# Patient Record
Sex: MALE | Race: WHITE | NOT HISPANIC OR LATINO | Employment: FULL TIME | RURAL
[De-identification: names, ages, dates, MRNs, and addresses within clinical notes are randomized per-mention and may not be internally consistent; named-entity substitution may affect disease eponyms.]

---

## 2020-08-08 ENCOUNTER — HISTORICAL (OUTPATIENT)
Dept: ADMINISTRATIVE | Facility: HOSPITAL | Age: 20
End: 2020-08-08

## 2020-08-08 LAB
ALBUMIN SERPL BCP-MCNC: 4.2 G/DL (ref 3.5–5)
ALBUMIN/GLOB SERPL: 1.3 {RATIO}
ALP SERPL-CCNC: 92 U/L (ref 45–115)
ALT SERPL W P-5'-P-CCNC: 22 U/L (ref 16–61)
ANION GAP SERPL CALCULATED.3IONS-SCNC: 9 MMOL/L
AST SERPL W P-5'-P-CCNC: 16 U/L (ref 15–37)
BASOPHILS # BLD AUTO: 0.1 X10E3/UL (ref 0–0.2)
BASOPHILS NFR BLD AUTO: 1.2 % (ref 0–1)
BILIRUB SERPL-MCNC: 0.4 MG/DL (ref 0–1.2)
BUN SERPL-MCNC: 8 MG/DL (ref 7–18)
BUN/CREAT SERPL: 7.1
CALCIUM SERPL-MCNC: 8.8 MG/DL (ref 8.5–10.1)
CHLORIDE SERPL-SCNC: 103 MMOL/L (ref 98–107)
CO2 SERPL-SCNC: 31 MMOL/L (ref 21–32)
CREAT SERPL-MCNC: 1.13 MG/DL (ref 0.7–1.3)
D DIMER PPP FEU-MCNC: 0.32 UG/ML (ref 0–0.47)
EOSINOPHIL # BLD AUTO: 0.08 X10E3/UL (ref 0–0.5)
EOSINOPHIL NFR BLD AUTO: 0.9 % (ref 1–4)
ERYTHROCYTE [DISTWIDTH] IN BLOOD BY AUTOMATED COUNT: 11.8 % (ref 11.5–14.5)
GLOBULIN SER-MCNC: 3.2 G/DL (ref 2–4)
GLUCOSE SERPL-MCNC: 96 MG/DL (ref 74–106)
HCT VFR BLD AUTO: 41.4 % (ref 40–54)
HGB BLD-MCNC: 13.7 G/DL (ref 13.5–18)
IMM GRANULOCYTES # BLD AUTO: 0.03 X10E3/UL (ref 0–0.04)
IMM GRANULOCYTES NFR BLD: 0.3 % (ref 0–0.4)
LYMPHOCYTES # BLD AUTO: 1.71 X10E3/UL (ref 1–4.8)
LYMPHOCYTES NFR BLD AUTO: 19.9 % (ref 27–41)
MCH RBC QN AUTO: 29 PG (ref 27–31)
MCHC RBC AUTO-ENTMCNC: 33.1 G/DL (ref 32–36)
MCV RBC AUTO: 87.5 FL (ref 80–96)
MONOCYTES # BLD AUTO: 0.55 X10E3/UL (ref 0–0.8)
MONOCYTES NFR BLD AUTO: 6.4 % (ref 2–6)
MPC BLD CALC-MCNC: 8.9 FL (ref 9.4–12.4)
NEUTROPHILS # BLD AUTO: 6.14 X10E3/UL (ref 1.8–7.7)
NEUTROPHILS NFR BLD AUTO: 71.3 % (ref 53–65)
NRBC # BLD AUTO: 0 X10E3/UL (ref 0–0)
NRBC, AUTO (.00): 0 /100 (ref 0–0)
PLATELET # BLD AUTO: 323 X10E3/UL (ref 150–400)
POTASSIUM SERPL-SCNC: 3.7 MMOL/L (ref 3.5–5.1)
PROT SERPL-MCNC: 7.4 G/DL (ref 6.4–8.2)
RBC # BLD AUTO: 4.73 X10E6/UL (ref 4.6–6.2)
SODIUM SERPL-SCNC: 139 MMOL/L (ref 136–145)
TROPONIN I SERPL-MCNC: <0.017 NG/ML (ref 0–0.06)
WBC # BLD AUTO: 8.61 X10E3/UL (ref 4.5–11)

## 2020-08-09 ENCOUNTER — HISTORICAL (OUTPATIENT)
Dept: ADMINISTRATIVE | Facility: HOSPITAL | Age: 20
End: 2020-08-09

## 2021-03-27 VITALS — BODY MASS INDEX: 29.92 KG/M2 | HEIGHT: 70 IN | WEIGHT: 209 LBS

## 2021-03-27 RX ORDER — ONDANSETRON 4 MG/1
8 TABLET, FILM COATED ORAL EVERY 6 HOURS PRN
COMMUNITY

## 2021-03-27 RX ORDER — METOPROLOL SUCCINATE 25 MG/1
25 TABLET, EXTENDED RELEASE ORAL DAILY
COMMUNITY
End: 2022-01-03 | Stop reason: ALTCHOICE

## 2021-03-27 RX ORDER — LAMOTRIGINE 100 MG/1
100 TABLET ORAL DAILY
COMMUNITY
End: 2022-06-27

## 2021-03-27 RX ORDER — EPINEPHRINE 0.15 MG/.3ML
0.15 INJECTION INTRAMUSCULAR
COMMUNITY

## 2021-04-16 DIAGNOSIS — M54.50 LOW BACK PAIN: Primary | ICD-10-CM

## 2021-04-22 DIAGNOSIS — M54.9 DORSALGIA, UNSPECIFIED: ICD-10-CM

## 2021-04-26 ENCOUNTER — OFFICE VISIT (OUTPATIENT)
Dept: SPINE | Facility: CLINIC | Age: 21
End: 2021-04-26
Payer: MEDICAID

## 2021-04-26 ENCOUNTER — HOSPITAL ENCOUNTER (OUTPATIENT)
Dept: RADIOLOGY | Facility: HOSPITAL | Age: 21
Discharge: HOME | End: 2021-04-26
Attending: ORTHOPAEDIC SURGERY
Payer: MEDICAID

## 2021-04-26 VITALS — HEIGHT: 71 IN | BODY MASS INDEX: 29.4 KG/M2 | WEIGHT: 210 LBS

## 2021-04-26 DIAGNOSIS — M40.14 OTHER SECONDARY KYPHOSIS, THORACIC REGION: Primary | ICD-10-CM

## 2021-04-26 DIAGNOSIS — M54.9 DORSALGIA, UNSPECIFIED: ICD-10-CM

## 2021-04-26 DIAGNOSIS — M54.50 LOW BACK PAIN: ICD-10-CM

## 2021-04-26 DIAGNOSIS — M54.6 PAIN IN THORACIC SPINE: ICD-10-CM

## 2021-04-26 PROCEDURE — 72110 X-RAY EXAM L-2 SPINE 4/>VWS: CPT | Mod: TC

## 2021-04-26 PROCEDURE — 72110 X-RAY EXAM L-2 SPINE 4/>VWS: CPT | Mod: 26,,, | Performed by: ORTHOPAEDIC SURGERY

## 2021-04-26 PROCEDURE — 99204 PR OFFICE/OUTPT VISIT, NEW, LEVL IV, 45-59 MIN: ICD-10-PCS | Mod: S$PBB,,, | Performed by: ORTHOPAEDIC SURGERY

## 2021-04-26 PROCEDURE — 99213 OFFICE O/P EST LOW 20 MIN: CPT | Mod: PBBFAC,25 | Performed by: ORTHOPAEDIC SURGERY

## 2021-04-26 PROCEDURE — 72070 X-RAY EXAM THORAC SPINE 2VWS: CPT | Mod: TC

## 2021-04-26 PROCEDURE — 72070 XR THORACIC SPINE AP LATERAL: ICD-10-PCS | Mod: 26,,, | Performed by: ORTHOPAEDIC SURGERY

## 2021-04-26 PROCEDURE — 99999 PR PBB SHADOW E&M-EST. PATIENT-LVL III: ICD-10-PCS | Mod: PBBFAC,,, | Performed by: ORTHOPAEDIC SURGERY

## 2021-04-26 PROCEDURE — 99204 OFFICE O/P NEW MOD 45 MIN: CPT | Mod: S$PBB,,, | Performed by: ORTHOPAEDIC SURGERY

## 2021-04-26 PROCEDURE — 72070 X-RAY EXAM THORAC SPINE 2VWS: CPT | Mod: 26,,, | Performed by: ORTHOPAEDIC SURGERY

## 2021-04-26 PROCEDURE — 72110 XR LUMBAR SPINE 5 VIEW WITH FLEX AND EXT: ICD-10-PCS | Mod: 26,,, | Performed by: ORTHOPAEDIC SURGERY

## 2021-04-26 PROCEDURE — 99999 PR PBB SHADOW E&M-EST. PATIENT-LVL III: CPT | Mod: PBBFAC,,, | Performed by: ORTHOPAEDIC SURGERY

## 2021-06-25 ENCOUNTER — OFFICE VISIT (OUTPATIENT)
Dept: GASTROENTEROLOGY | Facility: CLINIC | Age: 21
End: 2021-06-25
Payer: MEDICAID

## 2021-06-25 VITALS
HEART RATE: 78 BPM | DIASTOLIC BLOOD PRESSURE: 70 MMHG | RESPIRATION RATE: 18 BRPM | OXYGEN SATURATION: 98 % | SYSTOLIC BLOOD PRESSURE: 116 MMHG

## 2021-06-25 DIAGNOSIS — R11.2 NAUSEA AND VOMITING, INTRACTABILITY OF VOMITING NOT SPECIFIED, UNSPECIFIED VOMITING TYPE: ICD-10-CM

## 2021-06-25 DIAGNOSIS — R19.7 DIARRHEA, UNSPECIFIED TYPE: ICD-10-CM

## 2021-06-25 DIAGNOSIS — R10.9 ABDOMINAL PAIN, UNSPECIFIED ABDOMINAL LOCATION: Primary | ICD-10-CM

## 2021-06-25 DIAGNOSIS — K25.9 GASTRIC ULCER, UNSPECIFIED CHRONICITY, UNSPECIFIED WHETHER GASTRIC ULCER HEMORRHAGE OR PERFORATION PRESENT: ICD-10-CM

## 2021-06-25 DIAGNOSIS — K59.04 CHRONIC IDIOPATHIC CONSTIPATION: ICD-10-CM

## 2021-06-25 PROCEDURE — 99214 PR OFFICE/OUTPT VISIT, EST, LEVL IV, 30-39 MIN: ICD-10-PCS | Mod: ,,, | Performed by: NURSE PRACTITIONER

## 2021-06-25 PROCEDURE — 99214 OFFICE O/P EST MOD 30 MIN: CPT | Mod: ,,, | Performed by: NURSE PRACTITIONER

## 2021-06-25 RX ORDER — PANTOPRAZOLE SODIUM 40 MG/1
40 TABLET, DELAYED RELEASE ORAL 2 TIMES DAILY
Qty: 180 TABLET | Refills: 3 | Status: SHIPPED | OUTPATIENT
Start: 2021-06-25 | End: 2021-09-23

## 2021-07-15 ENCOUNTER — HOSPITAL ENCOUNTER (OUTPATIENT)
Dept: GASTROENTEROLOGY | Facility: HOSPITAL | Age: 21
Discharge: HOME | End: 2021-07-15
Attending: NURSE PRACTITIONER
Payer: MEDICAID

## 2021-07-15 VITALS
SYSTOLIC BLOOD PRESSURE: 95 MMHG | BODY MASS INDEX: 28.84 KG/M2 | DIASTOLIC BLOOD PRESSURE: 47 MMHG | WEIGHT: 206 LBS | RESPIRATION RATE: 15 BRPM | OXYGEN SATURATION: 96 % | TEMPERATURE: 98 F | HEIGHT: 71 IN | HEART RATE: 53 BPM

## 2021-07-15 DIAGNOSIS — K25.9 GASTRIC ULCER, UNSPECIFIED CHRONICITY, UNSPECIFIED WHETHER GASTRIC ULCER HEMORRHAGE OR PERFORATION PRESENT: ICD-10-CM

## 2021-07-15 DIAGNOSIS — R11.2 NAUSEA AND VOMITING, INTRACTABILITY OF VOMITING NOT SPECIFIED, UNSPECIFIED VOMITING TYPE: ICD-10-CM

## 2021-07-15 DIAGNOSIS — R10.9 ABDOMINAL PAIN, UNSPECIFIED ABDOMINAL LOCATION: ICD-10-CM

## 2021-07-15 RX ORDER — SODIUM CHLORIDE 0.9 % (FLUSH) 0.9 %
10 SYRINGE (ML) INJECTION
Status: DISCONTINUED | OUTPATIENT
Start: 2021-07-15 | End: 2021-07-16 | Stop reason: HOSPADM

## 2021-07-15 RX ORDER — SODIUM CHLORIDE 9 MG/ML
INJECTION, SOLUTION INTRAVENOUS CONTINUOUS
Status: DISCONTINUED | OUTPATIENT
Start: 2021-07-15 | End: 2021-07-16 | Stop reason: HOSPADM

## 2021-07-27 DIAGNOSIS — Z12.11 ENCOUNTER FOR SCREENING COLONOSCOPY: Primary | ICD-10-CM

## 2021-09-22 ENCOUNTER — OFFICE VISIT (OUTPATIENT)
Dept: OTOLARYNGOLOGY | Facility: CLINIC | Age: 21
End: 2021-09-22
Payer: MEDICAID

## 2021-09-22 VITALS — BODY MASS INDEX: 28.84 KG/M2 | HEIGHT: 71 IN | WEIGHT: 206 LBS

## 2021-09-22 DIAGNOSIS — H72.92 TYMPANIC MEMBRANE PERFORATION, LEFT: ICD-10-CM

## 2021-09-22 DIAGNOSIS — H66.92 LEFT OTITIS MEDIA, UNSPECIFIED OTITIS MEDIA TYPE: Primary | ICD-10-CM

## 2021-09-22 PROCEDURE — 99213 OFFICE O/P EST LOW 20 MIN: CPT | Mod: PBBFAC | Performed by: OTOLARYNGOLOGY

## 2021-09-22 PROCEDURE — 99204 PR OFFICE/OUTPT VISIT, NEW, LEVL IV, 45-59 MIN: ICD-10-PCS | Mod: S$PBB,,, | Performed by: OTOLARYNGOLOGY

## 2021-09-22 PROCEDURE — 99204 OFFICE O/P NEW MOD 45 MIN: CPT | Mod: S$PBB,,, | Performed by: OTOLARYNGOLOGY

## 2021-09-22 RX ORDER — AMOXICILLIN AND CLAVULANATE POTASSIUM 500; 125 MG/1; MG/1
1 TABLET, FILM COATED ORAL 2 TIMES DAILY
Qty: 28 TABLET | Refills: 0 | Status: SHIPPED | OUTPATIENT
Start: 2021-09-22 | End: 2021-10-06

## 2021-09-26 ENCOUNTER — HOSPITAL ENCOUNTER (EMERGENCY)
Facility: HOSPITAL | Age: 21
Discharge: HOME | End: 2021-09-26
Payer: MEDICAID

## 2021-09-26 VITALS
HEIGHT: 71 IN | HEART RATE: 90 BPM | DIASTOLIC BLOOD PRESSURE: 56 MMHG | BODY MASS INDEX: 28.7 KG/M2 | RESPIRATION RATE: 20 BRPM | TEMPERATURE: 97 F | SYSTOLIC BLOOD PRESSURE: 127 MMHG | OXYGEN SATURATION: 99 % | WEIGHT: 205 LBS

## 2021-09-26 DIAGNOSIS — R21 RASH: Primary | ICD-10-CM

## 2021-09-26 PROCEDURE — 99283 EMERGENCY DEPT VISIT LOW MDM: CPT | Mod: ,,, | Performed by: NURSE PRACTITIONER

## 2021-09-26 PROCEDURE — 99284 EMERGENCY DEPT VISIT MOD MDM: CPT

## 2021-09-26 PROCEDURE — 96372 THER/PROPH/DIAG INJ SC/IM: CPT

## 2021-09-26 PROCEDURE — 99283 PR EMERGENCY DEPT VISIT,LEVEL III: ICD-10-PCS | Mod: ,,, | Performed by: NURSE PRACTITIONER

## 2021-09-26 PROCEDURE — 63600175 PHARM REV CODE 636 W HCPCS: Performed by: NURSE PRACTITIONER

## 2021-09-26 RX ORDER — METHYLPREDNISOLONE ACETATE 40 MG/ML
40 INJECTION, SUSPENSION INTRA-ARTICULAR; INTRALESIONAL; INTRAMUSCULAR; SOFT TISSUE ONCE
Status: DISCONTINUED | OUTPATIENT
Start: 2021-09-26 | End: 2021-09-26

## 2021-09-26 RX ORDER — METHYLPREDNISOLONE ACETATE 40 MG/ML
40 INJECTION, SUSPENSION INTRA-ARTICULAR; INTRALESIONAL; INTRAMUSCULAR; SOFT TISSUE ONCE
Status: COMPLETED | OUTPATIENT
Start: 2021-09-26 | End: 2021-09-26

## 2021-09-26 RX ORDER — METHYLPREDNISOLONE 4 MG/1
TABLET ORAL
Qty: 1 PACKAGE | Refills: 0 | Status: SHIPPED | OUTPATIENT
Start: 2021-09-26 | End: 2021-10-17

## 2021-09-26 RX ORDER — DEXAMETHASONE SODIUM PHOSPHATE 4 MG/ML
4 INJECTION, SOLUTION INTRA-ARTICULAR; INTRALESIONAL; INTRAMUSCULAR; INTRAVENOUS; SOFT TISSUE
Status: COMPLETED | OUTPATIENT
Start: 2021-09-26 | End: 2021-09-26

## 2021-09-26 RX ADMIN — DEXAMETHASONE SODIUM PHOSPHATE 4 MG: 4 INJECTION, SOLUTION INTRAMUSCULAR; INTRAVENOUS at 07:09

## 2021-09-26 RX ADMIN — METHYLPREDNISOLONE ACETATE 40 MG: 40 INJECTION, SUSPENSION INTRA-ARTICULAR; INTRALESIONAL; INTRAMUSCULAR; SOFT TISSUE at 07:09

## 2021-09-27 ENCOUNTER — TELEPHONE (OUTPATIENT)
Dept: EMERGENCY MEDICINE | Facility: HOSPITAL | Age: 21
End: 2021-09-27

## 2021-09-28 ENCOUNTER — TELEPHONE (OUTPATIENT)
Dept: EMERGENCY MEDICINE | Facility: HOSPITAL | Age: 21
End: 2021-09-28

## 2021-11-09 DIAGNOSIS — M25.512 LEFT SHOULDER PAIN, UNSPECIFIED CHRONICITY: Primary | ICD-10-CM

## 2021-11-10 ENCOUNTER — HOSPITAL ENCOUNTER (OUTPATIENT)
Dept: RADIOLOGY | Facility: HOSPITAL | Age: 21
Discharge: HOME | End: 2021-11-10
Attending: NURSE PRACTITIONER
Payer: MEDICAID

## 2021-11-10 ENCOUNTER — OFFICE VISIT (OUTPATIENT)
Dept: ORTHOPEDICS | Facility: CLINIC | Age: 21
End: 2021-11-10
Payer: MEDICAID

## 2021-11-10 VITALS — HEIGHT: 71 IN | BODY MASS INDEX: 28.7 KG/M2 | WEIGHT: 205 LBS

## 2021-11-10 DIAGNOSIS — M25.512 LEFT SHOULDER PAIN, UNSPECIFIED CHRONICITY: Primary | ICD-10-CM

## 2021-11-10 DIAGNOSIS — M25.512 LEFT SHOULDER PAIN, UNSPECIFIED CHRONICITY: ICD-10-CM

## 2021-11-10 PROCEDURE — 73030 X-RAY EXAM OF SHOULDER: CPT | Mod: TC,LT

## 2021-11-10 PROCEDURE — 99212 PR OFFICE/OUTPT VISIT, EST, LEVL II, 10-19 MIN: ICD-10-PCS | Mod: ,,, | Performed by: NURSE PRACTITIONER

## 2021-11-10 PROCEDURE — 73030 X-RAY EXAM OF SHOULDER: CPT | Mod: 26,LT,, | Performed by: RADIOLOGY

## 2021-11-10 PROCEDURE — 73030 XR SHOULDER COMPLETE 2 OR MORE VIEWS LEFT: ICD-10-PCS | Mod: 26,LT,, | Performed by: RADIOLOGY

## 2021-11-10 PROCEDURE — 99212 OFFICE O/P EST SF 10 MIN: CPT | Mod: ,,, | Performed by: NURSE PRACTITIONER

## 2021-11-10 RX ORDER — NAPROXEN 500 MG/1
500 TABLET ORAL 2 TIMES DAILY WITH MEALS
Qty: 60 TABLET | Refills: 1 | Status: SHIPPED | OUTPATIENT
Start: 2021-11-10 | End: 2022-01-03 | Stop reason: ALTCHOICE

## 2021-11-15 ENCOUNTER — HOSPITAL ENCOUNTER (OUTPATIENT)
Dept: CARDIOLOGY | Facility: HOSPITAL | Age: 21
Discharge: HOME | End: 2021-11-15
Attending: INTERNAL MEDICINE
Payer: MEDICAID

## 2021-11-15 DIAGNOSIS — Z95.818 PRESENCE OF CARDIAC DEVICE: Primary | ICD-10-CM

## 2021-11-15 DIAGNOSIS — Z95.818 PRESENCE OF CARDIAC DEVICE: ICD-10-CM

## 2021-11-15 PROCEDURE — 93298 CARDIAC DEVICE CHECK - REMOTE: ICD-10-PCS | Mod: ,,, | Performed by: INTERNAL MEDICINE

## 2021-11-15 PROCEDURE — 93298 REM INTERROG DEV EVAL SCRMS: CPT | Mod: ,,, | Performed by: INTERNAL MEDICINE

## 2021-11-15 PROCEDURE — G2066 INTER DEVC REMOTE 30D: HCPCS

## 2021-12-08 ENCOUNTER — HOSPITAL ENCOUNTER (EMERGENCY)
Facility: HOSPITAL | Age: 21
Discharge: ANOTHER HOSPITAL | End: 2021-12-08
Payer: MEDICAID

## 2021-12-08 VITALS
DIASTOLIC BLOOD PRESSURE: 56 MMHG | WEIGHT: 200 LBS | HEART RATE: 66 BPM | RESPIRATION RATE: 16 BRPM | BODY MASS INDEX: 25.67 KG/M2 | OXYGEN SATURATION: 99 % | SYSTOLIC BLOOD PRESSURE: 100 MMHG | TEMPERATURE: 98 F | HEIGHT: 74 IN

## 2021-12-08 DIAGNOSIS — G40.901 STATUS EPILEPTICUS: Primary | ICD-10-CM

## 2021-12-08 DIAGNOSIS — G83.84 TODD'S PARALYSIS: ICD-10-CM

## 2021-12-08 LAB
ALBUMIN SERPL BCP-MCNC: 3.9 G/DL (ref 3.5–5)
ALBUMIN/GLOB SERPL: 1.3 {RATIO}
ALP SERPL-CCNC: 74 U/L (ref 45–115)
ALT SERPL W P-5'-P-CCNC: 31 U/L (ref 16–61)
AMPHET UR QL SCN: NEGATIVE
ANION GAP SERPL CALCULATED.3IONS-SCNC: 14 MMOL/L (ref 7–16)
AST SERPL W P-5'-P-CCNC: 19 U/L (ref 15–37)
BARBITURATES UR QL SCN: NEGATIVE
BASOPHILS # BLD AUTO: 0.02 K/UL (ref 0–0.2)
BASOPHILS NFR BLD AUTO: 0.3 % (ref 0–1)
BENZODIAZ METAB UR QL SCN: NEGATIVE
BILIRUB SERPL-MCNC: 0.2 MG/DL (ref 0–1.2)
BILIRUB UR QL STRIP: NEGATIVE
BUN SERPL-MCNC: 8 MG/DL (ref 7–18)
BUN/CREAT SERPL: 9 (ref 6–20)
CALCIUM SERPL-MCNC: 8.5 MG/DL (ref 8.5–10.1)
CANNABINOIDS UR QL SCN: NEGATIVE
CHLORIDE SERPL-SCNC: 105 MMOL/L (ref 98–107)
CLARITY UR: CLEAR
CO2 SERPL-SCNC: 27 MMOL/L (ref 21–32)
COCAINE UR QL SCN: NEGATIVE
COLOR UR: YELLOW
CREAT SERPL-MCNC: 0.85 MG/DL (ref 0.7–1.3)
DIFFERENTIAL METHOD BLD: ABNORMAL
EOSINOPHIL # BLD AUTO: 0.17 K/UL (ref 0–0.5)
EOSINOPHIL NFR BLD AUTO: 2.1 % (ref 1–4)
ERYTHROCYTE [DISTWIDTH] IN BLOOD BY AUTOMATED COUNT: 12.1 % (ref 11.5–14.5)
FLUAV AG UPPER RESP QL IA.RAPID: NEGATIVE
FLUBV AG UPPER RESP QL IA.RAPID: NEGATIVE
GLOBULIN SER-MCNC: 2.9 G/DL (ref 2–4)
GLUCOSE SERPL-MCNC: 93 MG/DL (ref 74–106)
GLUCOSE UR STRIP-MCNC: NEGATIVE MG/DL
HCT VFR BLD AUTO: 38.2 % (ref 40–54)
HGB BLD-MCNC: 13.3 G/DL (ref 13.5–18)
KETONES UR STRIP-SCNC: NEGATIVE MG/DL
LEUKOCYTE ESTERASE UR QL STRIP: NEGATIVE
LYMPHOCYTES # BLD AUTO: 2.09 K/UL (ref 1–4.8)
LYMPHOCYTES NFR BLD AUTO: 26.2 % (ref 27–41)
MCH RBC QN AUTO: 29.2 PG (ref 27–31)
MCHC RBC AUTO-ENTMCNC: 34.8 G/DL (ref 32–36)
MCV RBC AUTO: 83.8 FL (ref 80–96)
MONOCYTES # BLD AUTO: 0.63 K/UL (ref 0–0.8)
MONOCYTES NFR BLD AUTO: 7.9 % (ref 2–6)
MPC BLD CALC-MCNC: 8.7 FL (ref 9.4–12.4)
NEUTROPHILS # BLD AUTO: 5.07 K/UL (ref 1.8–7.7)
NEUTROPHILS NFR BLD AUTO: 63.5 % (ref 53–65)
NITRITE UR QL STRIP: NEGATIVE
OPIATES UR QL SCN: NEGATIVE
PCP UR QL SCN: NEGATIVE
PH UR STRIP: 7 PH UNITS
PLATELET # BLD AUTO: 242 K/UL (ref 150–400)
POTASSIUM SERPL-SCNC: 3.7 MMOL/L (ref 3.5–5.1)
PROT SERPL-MCNC: 6.8 G/DL (ref 6.4–8.2)
PROT UR QL STRIP: NEGATIVE
RBC # BLD AUTO: 4.56 M/UL (ref 4.6–6.2)
RBC # UR STRIP: NEGATIVE /UL
SARS-COV+SARS-COV-2 AG RESP QL IA.RAPID: NEGATIVE
SODIUM SERPL-SCNC: 142 MMOL/L (ref 136–145)
SP GR UR STRIP: 1.01
UROBILINOGEN UR STRIP-ACNC: 1 MG/DL
WBC # BLD AUTO: 7.98 K/UL (ref 4.5–11)

## 2021-12-08 PROCEDURE — 80053 COMPREHEN METABOLIC PANEL: CPT | Performed by: FAMILY MEDICINE

## 2021-12-08 PROCEDURE — 87428 SARSCOV & INF VIR A&B AG IA: CPT | Performed by: FAMILY MEDICINE

## 2021-12-08 PROCEDURE — 99285 EMERGENCY DEPT VISIT HI MDM: CPT | Mod: ,,, | Performed by: FAMILY MEDICINE

## 2021-12-08 PROCEDURE — 85025 COMPLETE CBC W/AUTO DIFF WBC: CPT | Performed by: FAMILY MEDICINE

## 2021-12-08 PROCEDURE — 81003 URINALYSIS AUTO W/O SCOPE: CPT | Performed by: FAMILY MEDICINE

## 2021-12-08 PROCEDURE — 99285 PR EMERGENCY DEPT VISIT,LEVEL V: ICD-10-PCS | Mod: ,,, | Performed by: FAMILY MEDICINE

## 2021-12-08 PROCEDURE — 96374 THER/PROPH/DIAG INJ IV PUSH: CPT

## 2021-12-08 PROCEDURE — 99285 EMERGENCY DEPT VISIT HI MDM: CPT | Mod: 25

## 2021-12-08 PROCEDURE — 80307 DRUG TEST PRSMV CHEM ANLYZR: CPT | Performed by: FAMILY MEDICINE

## 2021-12-08 PROCEDURE — 63600175 PHARM REV CODE 636 W HCPCS: Performed by: FAMILY MEDICINE

## 2021-12-08 PROCEDURE — 36415 COLL VENOUS BLD VENIPUNCTURE: CPT | Performed by: FAMILY MEDICINE

## 2021-12-08 RX ORDER — ARIPIPRAZOLE 400 MG
400 KIT INTRAMUSCULAR
COMMUNITY

## 2021-12-08 RX ORDER — LEVETIRACETAM 500 MG/5ML
1000 INJECTION, SOLUTION, CONCENTRATE INTRAVENOUS
Status: COMPLETED | OUTPATIENT
Start: 2021-12-08 | End: 2021-12-08

## 2021-12-08 RX ORDER — LAMOTRIGINE 100 MG/1
100 TABLET ORAL 2 TIMES DAILY
COMMUNITY

## 2021-12-08 RX ADMIN — LEVETIRACETAM 1000 MG: 100 INJECTION, SOLUTION INTRAVENOUS at 01:12

## 2021-12-27 ENCOUNTER — OFFICE VISIT (OUTPATIENT)
Dept: SPINE | Facility: CLINIC | Age: 21
End: 2021-12-27
Payer: MEDICAID

## 2021-12-27 ENCOUNTER — HOSPITAL ENCOUNTER (OUTPATIENT)
Dept: RADIOLOGY | Facility: HOSPITAL | Age: 21
Discharge: HOME | End: 2021-12-27
Attending: NURSE PRACTITIONER
Payer: MEDICAID

## 2021-12-27 DIAGNOSIS — M54.6 PAIN IN THORACIC SPINE: ICD-10-CM

## 2021-12-27 DIAGNOSIS — M54.2 NECK PAIN: ICD-10-CM

## 2021-12-27 DIAGNOSIS — M54.2 CERVICAL SPINE PAIN: Primary | ICD-10-CM

## 2021-12-27 PROCEDURE — 1159F PR MEDICATION LIST DOCUMENTED IN MEDICAL RECORD: ICD-10-PCS | Mod: CPTII,,, | Performed by: NURSE PRACTITIONER

## 2021-12-27 PROCEDURE — 72050 X-RAY EXAM NECK SPINE 4/5VWS: CPT | Mod: TC

## 2021-12-27 PROCEDURE — 72070 X-RAY EXAM THORAC SPINE 2VWS: CPT | Mod: TC

## 2021-12-27 PROCEDURE — 72070 XR THORACIC SPINE AP LATERAL: ICD-10-PCS | Mod: 26,,, | Performed by: STUDENT IN AN ORGANIZED HEALTH CARE EDUCATION/TRAINING PROGRAM

## 2021-12-27 PROCEDURE — 72050 X-RAY EXAM NECK SPINE 4/5VWS: CPT | Mod: 26,,, | Performed by: STUDENT IN AN ORGANIZED HEALTH CARE EDUCATION/TRAINING PROGRAM

## 2021-12-27 PROCEDURE — 99214 OFFICE O/P EST MOD 30 MIN: CPT | Mod: S$PBB,,, | Performed by: NURSE PRACTITIONER

## 2021-12-27 PROCEDURE — 72070 X-RAY EXAM THORAC SPINE 2VWS: CPT | Mod: 26,,, | Performed by: STUDENT IN AN ORGANIZED HEALTH CARE EDUCATION/TRAINING PROGRAM

## 2021-12-27 PROCEDURE — 72050 XR CERVICAL SPINE AP LAT WITH FLEX EXTEN: ICD-10-PCS | Mod: 26,,, | Performed by: STUDENT IN AN ORGANIZED HEALTH CARE EDUCATION/TRAINING PROGRAM

## 2021-12-27 PROCEDURE — 1159F MED LIST DOCD IN RCRD: CPT | Mod: CPTII,,, | Performed by: NURSE PRACTITIONER

## 2021-12-27 PROCEDURE — 99213 OFFICE O/P EST LOW 20 MIN: CPT | Mod: PBBFAC | Performed by: NURSE PRACTITIONER

## 2021-12-27 PROCEDURE — 99214 PR OFFICE/OUTPT VISIT, EST, LEVL IV, 30-39 MIN: ICD-10-PCS | Mod: S$PBB,,, | Performed by: NURSE PRACTITIONER

## 2021-12-27 RX ORDER — GABAPENTIN 100 MG/1
100 CAPSULE ORAL 3 TIMES DAILY
Qty: 90 CAPSULE | Refills: 11 | Status: SHIPPED | OUTPATIENT
Start: 2021-12-27 | End: 2022-04-25

## 2022-01-03 ENCOUNTER — HOSPITAL ENCOUNTER (OUTPATIENT)
Dept: RADIOLOGY | Facility: HOSPITAL | Age: 22
Discharge: HOME | End: 2022-01-03
Attending: NURSE PRACTITIONER
Payer: MEDICAID

## 2022-01-03 VITALS
OXYGEN SATURATION: 96 % | BODY MASS INDEX: 28.56 KG/M2 | HEIGHT: 71 IN | RESPIRATION RATE: 18 BRPM | DIASTOLIC BLOOD PRESSURE: 45 MMHG | TEMPERATURE: 98 F | WEIGHT: 204 LBS | SYSTOLIC BLOOD PRESSURE: 84 MMHG | HEART RATE: 67 BPM

## 2022-01-03 DIAGNOSIS — M54.2 NECK PAIN: ICD-10-CM

## 2022-01-03 RX ORDER — SODIUM CHLORIDE 450 MG/100ML
INJECTION, SOLUTION INTRAVENOUS ONCE
Status: DISCONTINUED | OUTPATIENT
Start: 2022-01-03 | End: 2022-01-04 | Stop reason: HOSPADM

## 2022-01-03 RX ORDER — ARIPIPRAZOLE 400 MG
400 KIT INTRAMUSCULAR
COMMUNITY
End: 2022-06-27

## 2022-01-03 RX ORDER — DIAZEPAM 5 MG/1
5 TABLET ORAL ONCE
Status: DISCONTINUED | OUTPATIENT
Start: 2022-01-03 | End: 2022-01-04 | Stop reason: HOSPADM

## 2022-01-10 DIAGNOSIS — M54.2 CERVICAL SPINE PAIN: Primary | ICD-10-CM

## 2022-02-01 ENCOUNTER — HOSPITAL ENCOUNTER (EMERGENCY)
Facility: HOSPITAL | Age: 22
Discharge: LEFT BEFORE DISCHARGE | End: 2022-02-01
Attending: FAMILY MEDICINE
Payer: MEDICAID

## 2022-02-01 VITALS
OXYGEN SATURATION: 97 % | DIASTOLIC BLOOD PRESSURE: 60 MMHG | WEIGHT: 195 LBS | BODY MASS INDEX: 27.3 KG/M2 | HEIGHT: 71 IN | HEART RATE: 93 BPM | TEMPERATURE: 98 F | RESPIRATION RATE: 16 BRPM | SYSTOLIC BLOOD PRESSURE: 106 MMHG

## 2022-02-01 DIAGNOSIS — R55 SYNCOPE, UNSPECIFIED SYNCOPE TYPE: Primary | ICD-10-CM

## 2022-02-01 DIAGNOSIS — R56.9 SEIZURE: ICD-10-CM

## 2022-02-01 PROCEDURE — 99283 EMERGENCY DEPT VISIT LOW MDM: CPT | Mod: ,,, | Performed by: FAMILY MEDICINE

## 2022-02-01 PROCEDURE — 99283 EMERGENCY DEPT VISIT LOW MDM: CPT

## 2022-02-01 PROCEDURE — 99283 PR EMERGENCY DEPT VISIT,LEVEL III: ICD-10-PCS | Mod: ,,, | Performed by: FAMILY MEDICINE

## 2022-02-01 PROCEDURE — 93005 ELECTROCARDIOGRAM TRACING: CPT

## 2022-02-01 PROCEDURE — 93010 EKG 12-LEAD: ICD-10-PCS | Mod: ,,, | Performed by: INTERNAL MEDICINE

## 2022-02-01 PROCEDURE — 93010 ELECTROCARDIOGRAM REPORT: CPT | Mod: ,,, | Performed by: INTERNAL MEDICINE

## 2022-02-02 NOTE — ED PROVIDER NOTES
"Encounter Date: 2/1/2022       History     Chief Complaint   Patient presents with    Loss of Consciousness     Syncope vs seizure at home as reported by family to ems.  States his blood sugar was 67 at home.  RBS per EMS 104mg/dl.     Patient presents to the ER by EMS with chief complaint of a syncopal episode.  Patient was standing in his kitchen when he fell to the floor.  He is not certain how long he was out.  Blood sugar was 67 at home and his mental status improved after eating some sweets.  In the ambulance his blood glucose was 104. Patient has a history of seizures.  On presentation he is not postictal.  No other issues reported.          Review of patient's allergies indicates:   Allergen Reactions    Pseudoephedrine Itching and Other (See Comments)     hands  Numbness to hands  seizure      Asa [aspirin] Hives    Aspirin Hives     Other reaction(s): Unknown    Dimetapp cold and flu     Dimetapp cold-allergy [brompheniramine-phenylephrine] Other (See Comments)     Hands were numb    Red dye Other (See Comments)     seizure  seizure      Red dye     Sudafed cold-allergy Hives    Sudafed [pseudoephedrine hcl]      Past Medical History:   Diagnosis Date    Asthma     Bipolar disorder     "adjustment disorder"    Coronary artery disease     Digestive disorder     Palpitation     Seizures     Syncope      Past Surgical History:   Procedure Laterality Date    DENTAL SURGERY      INSERTION OF IMPLANTABLE LOOP RECORDER      VAGUS NERVE STIMULATOR INSERTION       History reviewed. No pertinent family history.  Social History     Tobacco Use    Smoking status: Current Every Day Smoker     Packs/day: 1.00    Smokeless tobacco: Never Used   Substance Use Topics    Alcohol use: Not Currently    Drug use: Never     Review of Systems   Constitutional: Negative for fever.   HENT: Negative for sore throat.    Respiratory: Negative for shortness of breath.    Cardiovascular: Negative for chest pain. "   Gastrointestinal: Negative for nausea.   Genitourinary: Negative for dysuria.   Musculoskeletal: Negative for back pain.   Skin: Negative for rash.   Neurological: Positive for syncope. Negative for weakness.   Hematological: Does not bruise/bleed easily.       Physical Exam     Initial Vitals [02/01/22 2148]   BP Pulse Resp Temp SpO2   122/68 93 16 98.3 °F (36.8 °C) 96 %      MAP       --         Physical Exam    Nursing note and vitals reviewed.  Constitutional: Vital signs are normal. He appears well-developed.   HENT:   Head: Normocephalic and atraumatic.   Eyes: Conjunctivae, EOM and lids are normal. Pupils are equal, round, and reactive to light. Right eye exhibits no discharge. Left eye exhibits no discharge. No scleral icterus.   Cardiovascular: Normal rate, regular rhythm, S1 normal, S2 normal and normal heart sounds.   Pulmonary/Chest: Breath sounds normal. No respiratory distress. He has no wheezes. He has no rhonchi. He has no rales. He exhibits no tenderness.   Abdominal: Abdomen is soft. Bowel sounds are normal. He exhibits no distension and no mass. There is no abdominal tenderness. There is no rebound and no guarding.     Neurological: He is alert and oriented to person, place, and time. GCS score is 15. GCS eye subscore is 4. GCS verbal subscore is 5. GCS motor subscore is 6.   Skin: Skin is warm, dry and intact. No rash and no abscess noted. No erythema. No pallor.   Psychiatric: He has a normal mood and affect. His speech is normal and behavior is normal. Judgment and thought content normal.         Medical Screening Exam   See Full Note    ED Course   Procedures  Labs Reviewed   CBC W/ AUTO DIFFERENTIAL    Narrative:     The following orders were created for panel order CBC Auto Differential.  Procedure                               Abnormality         Status                     ---------                               -----------         ------                     CBC with  Differential[[id number]]                                                         Please view results for these tests on the individual orders.   COMPREHENSIVE METABOLIC PANEL   TROPONIN I   CBC WITH DIFFERENTIAL     EKG Readings: (Independently Interpreted)   Rate is 89, normal sinus rhythm, normal axis, normal intervals, no obvious signs of ischemia, no significant ST segment changes.     ECG Results          EKG 12-lead (In process)  Result time 02/01/22 22:24:06    In process by Interface, Lab In [facility] (02/01/22 22:24:06)                 Narrative:    Test Reason : R56.9,    Vent. Rate : 089 BPM     Atrial Rate : 089 BPM     P-R Int : 116 ms          QRS Dur : 092 ms      QT Int : 344 ms       P-R-T Axes : 031 047 040 degrees     QTc Int : 418 ms    Normal sinus rhythm  Possible Lateral infarct ,age undetermined  Abnormal ECG  No previous ECGs available    Referred By: [email or web address]   SELF           Confirmed By:                             Imaging Results    None          Medications - No data to display  Medical Decision Making:   ED Management:  Patient refused lab work and further evaluation.  Instead he chose to sign out against medical advice.  The risks of this decision were explained to the patient in detail.  He verbalized understanding of this discussion and persisted in signing out against medical advice.                   Clinical Impression:   Final diagnoses:  [R55] Syncope, unspecified syncope type (Primary)          ED Disposition Condition    ZA Lafleur,   02/01/22 1793

## 2022-02-02 NOTE — ED TRIAGE NOTES
Brought in by [facility] with c/o syncope vs seizure per family statement.  Pt states that he don't know what happened all he remembers is he was walking out of his bedroom to the kitchen and he woke up on the floor.  Pt doesn't know how long he was out.  No loss of bowel or bladder control during this episode.

## 2022-02-03 DIAGNOSIS — M54.12 CERVICAL RADICULOPATHY: Primary | ICD-10-CM

## 2022-03-16 ENCOUNTER — HOSPITAL ENCOUNTER (EMERGENCY)
Facility: HOSPITAL | Age: 22
Discharge: LEFT BEFORE DISCHARGE | End: 2022-03-16
Payer: MEDICAID

## 2022-03-16 VITALS
RESPIRATION RATE: 12 BRPM | OXYGEN SATURATION: 97 % | HEART RATE: 94 BPM | BODY MASS INDEX: 27.72 KG/M2 | WEIGHT: 198 LBS | HEIGHT: 71 IN | SYSTOLIC BLOOD PRESSURE: 128 MMHG | TEMPERATURE: 98 F | DIASTOLIC BLOOD PRESSURE: 72 MMHG

## 2022-03-16 DIAGNOSIS — R07.9 CHEST PAIN, UNSPECIFIED TYPE: Primary | ICD-10-CM

## 2022-03-16 DIAGNOSIS — R07.9 CHEST PAIN: ICD-10-CM

## 2022-03-16 PROCEDURE — 99282 PR EMERGENCY DEPT VISIT,LEVEL II: ICD-10-PCS | Mod: ,,, | Performed by: REGISTERED NURSE

## 2022-03-16 PROCEDURE — 99282 EMERGENCY DEPT VISIT SF MDM: CPT | Mod: ,,, | Performed by: REGISTERED NURSE

## 2022-03-16 PROCEDURE — 93010 ELECTROCARDIOGRAM REPORT: CPT | Mod: ,,, | Performed by: STUDENT IN AN ORGANIZED HEALTH CARE EDUCATION/TRAINING PROGRAM

## 2022-03-16 PROCEDURE — 93010 EKG 12-LEAD: ICD-10-PCS | Mod: ,,, | Performed by: STUDENT IN AN ORGANIZED HEALTH CARE EDUCATION/TRAINING PROGRAM

## 2022-03-16 PROCEDURE — 99283 EMERGENCY DEPT VISIT LOW MDM: CPT

## 2022-03-16 PROCEDURE — 93005 ELECTROCARDIOGRAM TRACING: CPT

## 2022-03-16 RX ORDER — VERAPAMIL HYDROCHLORIDE 180 MG/1
180 CAPSULE, EXTENDED RELEASE ORAL DAILY
COMMUNITY

## 2022-03-17 NOTE — ED PROVIDER NOTES
"Encounter Date: 3/16/2022       History     Chief Complaint   Patient presents with    Chest Pain     21y-old  male received to room 3 with complaint of chest pain that started at 1800 tonight 03/16/2022. Pain described as a sharp pain with no aggravating or alleviating factors.         Review of patient's allergies indicates:   Allergen Reactions    Pseudoephedrine Itching and Other (See Comments)     hands  Numbness to hands  seizure      Asa [aspirin] Hives    Aspirin Hives     Other reaction(s): Unknown    Dimetapp cold and flu     Dimetapp cold-allergy [brompheniramine-phenylephrine] Other (See Comments)     Hands were numb    Red dye Other (See Comments)     seizure  seizure      Red dye     Sudafed cold-allergy Hives    Sudafed [pseudoephedrine hcl]      Past Medical History:   Diagnosis Date    Asthma     Bipolar disorder     "adjustment disorder"    Coronary artery disease     Digestive disorder     Palpitation     Seizures     Syncope      Past Surgical History:   Procedure Laterality Date    DENTAL SURGERY      INSERTION OF IMPLANTABLE LOOP RECORDER      VAGUS NERVE STIMULATOR INSERTION       History reviewed. No pertinent family history.  Social History     Tobacco Use    Smoking status: Current Every Day Smoker     Packs/day: 1.00    Smokeless tobacco: Never Used   Substance Use Topics    Alcohol use: Not Currently    Drug use: Never     Review of Systems   Constitutional: Negative.    HENT: Negative.    Eyes: Negative.    Respiratory: Negative for apnea, cough, choking, chest tightness, shortness of breath, wheezing and stridor.    Cardiovascular: Positive for chest pain. Negative for palpitations and leg swelling.   Gastrointestinal: Positive for nausea. Negative for vomiting.   Endocrine: Negative.    Genitourinary: Negative.    Musculoskeletal: Negative.    Skin: Negative.    Allergic/Immunologic: Negative.    Neurological: Negative.    Hematological: Negative.  " "  Psychiatric/Behavioral: Negative.        Physical Exam     Initial Vitals [03/16/22 2119]   BP Pulse Resp Temp SpO2   128/72 94 12 98.3 °F (36.8 °C) 97 %      MAP       --         Physical Exam    Constitutional: He appears well-developed and well-nourished.   HENT:   Head: Normocephalic and atraumatic.   Right Ear: External ear normal.   Left Ear: External ear normal.   Nose: Nose normal.   Mouth/Throat: Oropharynx is clear and moist.   Eyes: Conjunctivae and EOM are normal.   Neck: Neck supple.   Normal range of motion.  Cardiovascular: Normal rate, regular rhythm, normal heart sounds and intact distal pulses.   Pulmonary/Chest: Breath sounds normal.   Abdominal: Abdomen is soft. Bowel sounds are normal.   Musculoskeletal:         General: Normal range of motion.      Cervical back: Normal range of motion and neck supple.     Neurological: He is alert and oriented to person, place, and time. He has normal strength and normal reflexes.   Skin: Skin is warm and dry. Capillary refill takes less than 2 seconds.   Psychiatric: He has a normal mood and affect. His behavior is normal. Judgment and thought content normal.         Medical Screening Exam   See Full Note    ED Course   Procedures  Labs Reviewed   CBC W/ AUTO DIFFERENTIAL    Narrative:     The following orders were created for panel order CBC auto differential.  Procedure                               Abnormality         Status                     ---------                               -----------         ------                     CBC with Differential[[id number]]                                                         Please view results for these tests on the individual orders.   BASIC METABOLIC PANEL   TROPONIN I   CBC WITH DIFFERENTIAL          Imaging Results    None          Medications - No data to display  Medical Decision Making:   ED Management:  I spoke to patient at length r/t recommended labs/xray/EKG. Patient states that "once everything " "comes back I want to be transferred to [place]." I explained to patient that I would be glad to continue work up and transfer patient only if appropriate. Patient states "I want to leave now." Instructed patient that I recommended work up, patient refuses and decided to leave AMA. I discussed the dangers of leaving against medical advice including death. Patient verbalizes understanding and still wishes to leave.                    Clinical Impression:   Final diagnoses:  [R07.9] Chest pain  [R07.9] Chest pain, unspecified type (Primary)          ED Disposition Condition    ZA Ayala NP-C  03/16/22 5678    "

## 2022-04-25 ENCOUNTER — HOSPITAL ENCOUNTER (OUTPATIENT)
Dept: RADIOLOGY | Facility: HOSPITAL | Age: 22
Discharge: HOME | End: 2022-04-25
Attending: ORTHOPAEDIC SURGERY
Payer: MEDICAID

## 2022-04-25 ENCOUNTER — OFFICE VISIT (OUTPATIENT)
Dept: SPINE | Facility: CLINIC | Age: 22
End: 2022-04-25
Payer: MEDICAID

## 2022-04-25 DIAGNOSIS — M54.12 CERVICAL RADICULOPATHY: ICD-10-CM

## 2022-04-25 DIAGNOSIS — M54.16 LUMBAR RADICULOPATHY: ICD-10-CM

## 2022-04-25 DIAGNOSIS — M54.16 LUMBAR RADICULOPATHY: Primary | ICD-10-CM

## 2022-04-25 PROCEDURE — 99213 OFFICE O/P EST LOW 20 MIN: CPT | Mod: PBBFAC | Performed by: ORTHOPAEDIC SURGERY

## 2022-04-25 PROCEDURE — 72110 X-RAY EXAM L-2 SPINE 4/>VWS: CPT | Mod: 26,,, | Performed by: ORTHOPAEDIC SURGERY

## 2022-04-25 PROCEDURE — 99214 OFFICE O/P EST MOD 30 MIN: CPT | Mod: 25,S$PBB,, | Performed by: ORTHOPAEDIC SURGERY

## 2022-04-25 PROCEDURE — 72110 X-RAY EXAM L-2 SPINE 4/>VWS: CPT | Mod: TC

## 2022-04-25 PROCEDURE — 99214 PR OFFICE/OUTPT VISIT, EST, LEVL IV, 30-39 MIN: ICD-10-PCS | Mod: 25,S$PBB,, | Performed by: ORTHOPAEDIC SURGERY

## 2022-04-25 PROCEDURE — 99406 PR TOBACCO USE CESSATION INTERMEDIATE 3-10 MINUTES: ICD-10-PCS | Mod: S$PBB,,, | Performed by: ORTHOPAEDIC SURGERY

## 2022-04-25 PROCEDURE — 99406 BEHAV CHNG SMOKING 3-10 MIN: CPT | Mod: S$PBB,,, | Performed by: ORTHOPAEDIC SURGERY

## 2022-04-25 PROCEDURE — 72110 XR LUMBAR SPINE 4-5 VIEW WITH BENDING VIEWS: ICD-10-PCS | Mod: 26,,, | Performed by: ORTHOPAEDIC SURGERY

## 2022-04-25 RX ORDER — GABAPENTIN 300 MG/1
300 CAPSULE ORAL 3 TIMES DAILY
Qty: 90 CAPSULE | Refills: 11 | Status: SHIPPED | OUTPATIENT
Start: 2022-04-25 | End: 2023-05-01

## 2022-04-25 NOTE — PROGRESS NOTES
AP, lateral, flexion/extension views of the lumbar spine reviewed    On the AP there is slight lumbar curvature.  There are 5 non-rib-bearing lumbar vertebrae.  On the lateral there is maintained lumbar lordosis.  No fractures or listhesis noted.  No instability on flexion-extension views.    Impression:  Normal lumbar spine

## 2022-04-27 NOTE — PROGRESS NOTES
Smoking Cessation counseling    Time spent:  3-10 minutes (28774)    We discussed the importance, over both the short and long-term, of smoking cessation; reviewed the patient's willingness to quit; overall history and current use of tobacco smoking products; that there are a variety of methods to help with smoking diminution and cessation (stopping alone, using nicotine substitution medications/gums, Chantix, other medications, etcetera, which the patient will also discuss with his or her primary care physician); that the patient should set a date for smoking cessation; and the patient will follow up with his or her primary care physician for further support and oversight.    We also discussed that for the patient to have surgery while using nicotine, wound healing may be compromised relative to those who do not smoke; that recovery from anesthesia may sometimes be more difficult; and that quitting may decrease the heart, vascular, pulmonary effects in the short term as well as over the long term.  Smoking cessation may be useful and important for any patient who will have a fusion as part of surgery or have bone or tissue that has regrown heal (bone fusions, wound closures, etc.)  since surgical results with respect to wound healing, susceptibility to recovery from infection, bone fusion, and tissue and blood vessel health may be impaired or less optimal in smokers than nonsmokers.  We talked about the elevated risk of surgical bone fusion failure in the setting of smoking and the potential need for a higher-risk revision surgery should fusion not occur.    I reviewed this with the patient in detail and all questions were answered.  We discussed nature of the patient's condition; real alternatives and realistic options; pros and cons, risks and benefits of all the options, in detail.  I believe the patient understands the above and wishes to proceed as outlined.

## 2022-04-27 NOTE — PROGRESS NOTES
"  MDM/time:  Greater than 30 minutes spent on this encounter including 10 minutes reviewing imaging and notes, 15 minutes with the patient, 5 minutes documentation    ASSESSMENT:  21 y.o. male with lumbar strain, thoracic kyphoscoliosis     PLAN:  Refill Neurontin.  Physical therapy and home exercise program.  Follow-up in 4 months.  Continue to work on smoking cessation    HPI:  21 y.o. male here for evaluation of low back pain.  Denies any lower extremity symptoms.  Reports Flexeril been helping some.  Still complains of some left upper extremity pain into the shoulder.  Did not get the CT myelogram approve after last visit.  Still awaiting EMG bilateral upper extremities.  Has had left shoulder surgery with Dr. David Reese did well with surgery the patient reports over the past month has had increased pain in the left shoulder left arm pain in the shoulder popping out of place.  He reports he has seen the nurse practitioner in orthopedics and was told "there was nothing that could be done".  Denies any new injuries or falls.    Denies bladder bowel incontinence.  Difficulty walking more than 20 ft or standing for prolonged periods of time due to back pain.  Currently taken Tylenol and naproxen as needed for pain.  Has been to physical therapy multiple times in the past with minimal pain relief, does not want anymore PT.  Has been seen by pain management in the past was given a full back brace to wear for kyphosis patient reports he could not wear this brace due to increased pain.  No recent MRI unable to have an MRI due to a vagus nerve stimulator and a loop recorder that he has placed for history of seizures.  No prior spine surgery.  Currently smokes 1 pack of cigarettes per day.    IMAGIN2021 cervical spine xray reviewed showed:   No acute fracture or dislocation.  No dynamic instability.    2021 Thoracic spine xray showed:   On the AP there is thoracic curvature to the right.  There is a pulse " "generator over the left chest.  On the lateral view there is increased thoracic kyphosis.  No fractures or listhesis noted.    4/26/2021 Lumbar spine xray showed:   AP, lateral, flexion/extension views of the lumbar spine reviewed   On the AP there is normal coronal alignment.  There are 5 non-rib-bearing lumbar vertebra.  On the lateral there is maintained lumbar lordosis.  No fractures or listhesis noted. No instability on flexion-extension     Past Medical History:   Diagnosis Date    Asthma     Bipolar disorder     "adjustment disorder"    Coronary artery disease     Digestive disorder     Palpitation     Seizures     Syncope      Past Surgical History:   Procedure Laterality Date    DENTAL SURGERY      INSERTION OF IMPLANTABLE LOOP RECORDER      VAGUS NERVE STIMULATOR INSERTION       Social History     Tobacco Use    Smoking status: Current Every Day Smoker     Packs/day: 1.00    Smokeless tobacco: Never Used   Substance Use Topics    Alcohol use: Not Currently    Drug use: Never     Current Outpatient Medications   Medication Instructions    ABILIFY MAINTENA 400 mg, Intramuscular, Every 28 days    ABILIFY MAINTENA 400 mg, Intramuscular, Every 28 days    EPINEPHrine (EPIPEN JR) 0.15 mg, Intramuscular, As needed (PRN)    gabapentin (NEURONTIN) 300 mg, Oral, 3 times daily    lamoTRIgine (LAMICTAL) 100 mg, Oral, Daily    lamoTRIgine (LAMICTAL) 100 mg, Oral, 2 times daily    ondansetron (ZOFRAN) 8 mg, Oral, Every 6 hours PRN    pantoprazole (PROTONIX) 40 mg, Oral, 2 times daily    verapamiL (VERELAN) 180 mg, Oral, Daily     EXAM:  Constitutional  General Appearance: BMI 29.29 over weight, NAD  Psychiatric   Orientation: Oriented to time, oriented to place, oriented to person  Mood and Affect: Active and alert, normal mood, normal affect  Gait and Station   Appearance:  Normal gait, normal tandem gait, able to walk on toes, able to walk on heels  CERVICAL  Musculoskeletal System  Shoulder:  " normal appearance, no instability, no tenderness, painful decreased ROM right, normal ROM left    Cervical Spine  Inspection:  alignment normal, no muscle atrophy  Soft Tissue Palpation on the Right:  no tenderness of the paracervicals, no tenderness of the trapezius, no tenderness of the rhomboid  Soft Tissue Palpation on the Left:  no tenderness of the paracervicals, no tenderness of the trapezius, no tenderness of the rhomboid  Bony Palpation:  no tenderness of the occipital protuberance  Active Range:     Flexion decreased, Extension decreased, Rotation to the left normal, Rotation to the right normal, Lateral flexion to the left normal, Lateral flexion to the right normal   No pain elicited on motion    Motor Strength  C5 on the right:  abduction deltoid 5/5  C5 on the left:  abduction deltoid 3/5  C6 on the Right:  flexion biceps 5/5, wrist extension 5/5  C6 on the Left:  flexion biceps 4/5, wrist extension 4/5  C7 on the Right:  extension fingers 5/5, extension triceps 5/5  C7 on the Left:  extension fingers 4/5, extension triceps 4/5  C8 on the Right:  flexion fingers 5/5  C8 on the Left:  flexion fingers 4/5  T1 on the Right:  abduction fingers 5/5  T1 on the Left:  abduction fingers 4/5    Neurological System  Sensation on the Right:  normal sensation of the extremities: right, normal median nerve distribution, normal ulnar nerve distribution  Sensation on the Left:  normal sensation of the extremities: left, normal median nerve distribution, normal ulnar nerve distribution  Biceps Reflex Right:  normal, Brachioradialis Reflex Right:  normal, Triceps Reflex Right: normal  Biceps Reflex Left:  normal, Brachioradialis Reflex Left: normal, Triceps Reflex Left:  normal  Special Test on the Right:  Spurlings test negative, Hoffmans reflex absent, no ankle clonus, Durkan test negative, Tinels sign negative at the elbow  Special Test on the Left:  Spurlings test negative, Hoffmans reflex absent, no ankle clonus,  Durkan test negative, Tinels sign negative at the elbow    Skin:   Head and Neck:  normal   Right Upper Extremity:  normal   Left Upper Extremity:  normal    Cardiovascular:   Arterial Pulses Right:  radial right   Arterial Pulses Left:  radial left   Edema Right:  none   Edema Left:  none    Thoracic Spine   Inspection:  Normal alignment, no overlying skin changes  Bony Palpation:  No tenderness of the spinous process  Soft Tissue Palpation: No tenderness of the paraspinals left, no tenderness of the paraspinals right  Active Range of Motion:  extension normal, flexion normal    Motor Strength   L1 Motor Strength on the Right:  Hip flexion iliopsoas 5/5    L1 Motor Strength on the Left:  Hip flexion iliopsoas 5/5  L2-L4 Motor Strength on the Right:  Knee extension quadriceps 5/5, tibialis anterior 5/5  L2-L4 Motor Strength on the Left:  Knee extension quadriceps 5/5, tibialis anterior 5/5   L5 Motor Strength on the Right:  Extensor halluces longus 5/5,    L5 Motor Strength on the Left:  Extensor halluces longus 5/5,    S1 Motor Strength on the Right:  Plantar flexion gastrocnemius 5/5   S1 Motor Strength on the Left:  Plantar flexion gastrocnemius 5/5    Neurological System   Ankle Reflex Right:  normal   Ankle Reflex Left: normal   Knee Reflex Right:  normal   Knee Reflex Left:  normal   Sensation on the Right:  L2 normal, L3 normal, L4 normal, L5 normal, S1 normal   Sensation on the Left:  L2 normal, L3 normal, L4 normal, L5 normal, S1 normal  Special Test on the Right:  Seated straight leg raising test negative, no clonus of the ankle  Special Test on the Left:  Seated straight leg raising test negative, no clonus of the ankle    Skin   Lumbosacral Spine:  Normal skin    Cardiovascular System   Arterial Pulses Right:  Posterior tibialis normal, dorsalis pedis normal, popliteal normal   Arterial Pulses Left:  Posterior tibialis normal, dorsalis pedis normal, popliteal normal   Edema Right: None   Edema Left:   None

## 2022-04-28 ENCOUNTER — TELEPHONE (OUTPATIENT)
Dept: SPINE | Facility: CLINIC | Age: 22
End: 2022-04-28
Payer: MEDICAID

## 2022-04-28 NOTE — TELEPHONE ENCOUNTER
Patient asked that I reschedule his CT myelogram. Scheduled for 5/09 @ 420 and notified him of appt. Also notified him of EMG appt at [facility] on 5/4 @ 1230. He verbalizes understanding

## 2022-05-06 ENCOUNTER — TELEPHONE (OUTPATIENT)
Dept: SPINE | Facility: CLINIC | Age: 22
End: 2022-05-06
Payer: MEDICAID

## 2022-05-06 NOTE — TELEPHONE ENCOUNTER
RESCHEDULED HIS CT MYELOGRAM FOR 6/27 @ 730 and FOLLOW UP WITH DR RAY 6/29 @ 1015 PER HIS REQUEST. HE VERBALIZES UNDERSTANDING----- Message from Omer Burnette, Patient Care Assistant sent at 5/6/2022  9:28 AM CDT -----  Patient wants to reschedule his ct for the end of June. Patients number is [phone]

## 2022-05-18 ENCOUNTER — OUTSIDE PLACE OF SERVICE (OUTPATIENT)
Dept: CARDIOLOGY | Facility: CLINIC | Age: 22
End: 2022-05-18
Payer: MEDICAID

## 2022-05-18 PROCEDURE — 93010 PR ELECTROCARDIOGRAM REPORT: ICD-10-PCS | Mod: ,,, | Performed by: INTERNAL MEDICINE

## 2022-05-18 PROCEDURE — 93010 ELECTROCARDIOGRAM REPORT: CPT | Mod: ,,, | Performed by: INTERNAL MEDICINE

## 2022-05-21 ENCOUNTER — OUTSIDE PLACE OF SERVICE (OUTPATIENT)
Dept: CARDIOLOGY | Facility: CLINIC | Age: 22
End: 2022-05-21
Payer: MEDICAID

## 2022-05-21 PROCEDURE — 93010 ELECTROCARDIOGRAM REPORT: CPT | Mod: ,,, | Performed by: STUDENT IN AN ORGANIZED HEALTH CARE EDUCATION/TRAINING PROGRAM

## 2022-05-21 PROCEDURE — 93010 PR ELECTROCARDIOGRAM REPORT: ICD-10-PCS | Mod: ,,, | Performed by: STUDENT IN AN ORGANIZED HEALTH CARE EDUCATION/TRAINING PROGRAM

## 2022-05-31 ENCOUNTER — OUTSIDE PLACE OF SERVICE (OUTPATIENT)
Dept: CARDIOLOGY | Facility: CLINIC | Age: 22
End: 2022-05-31
Payer: MEDICAID

## 2022-05-31 PROCEDURE — 93010 ELECTROCARDIOGRAM REPORT: CPT | Mod: ,,, | Performed by: INTERNAL MEDICINE

## 2022-05-31 PROCEDURE — 93010 PR ELECTROCARDIOGRAM REPORT: ICD-10-PCS | Mod: ,,, | Performed by: INTERNAL MEDICINE

## 2022-06-02 ENCOUNTER — TELEPHONE (OUTPATIENT)
Dept: SPINE | Facility: CLINIC | Age: 22
End: 2022-06-02
Payer: MEDICAID

## 2022-06-02 NOTE — TELEPHONE ENCOUNTER
Notified patient Dr. Lennon reviewed EMG result- Normal study and patient may follow up PRN. Patient verbalizes understanding.

## 2022-06-27 ENCOUNTER — HOSPITAL ENCOUNTER (OUTPATIENT)
Dept: RADIOLOGY | Facility: HOSPITAL | Age: 22
Discharge: HOME | End: 2022-06-27
Attending: NURSE PRACTITIONER
Payer: MEDICAID

## 2022-06-27 VITALS
SYSTOLIC BLOOD PRESSURE: 104 MMHG | OXYGEN SATURATION: 98 % | HEART RATE: 62 BPM | DIASTOLIC BLOOD PRESSURE: 51 MMHG | TEMPERATURE: 98 F | RESPIRATION RATE: 16 BRPM

## 2022-06-27 LAB
APTT PPP: 26.4 SECONDS (ref 25.2–37.3)
BASOPHILS # BLD AUTO: 0 K/UL (ref 0–0.2)
BASOPHILS NFR BLD AUTO: 0 % (ref 0–1)
DIFFERENTIAL METHOD BLD: ABNORMAL
EOSINOPHIL # BLD AUTO: 0.11 K/UL (ref 0–0.5)
EOSINOPHIL NFR BLD AUTO: 1.7 % (ref 1–4)
ERYTHROCYTE [DISTWIDTH] IN BLOOD BY AUTOMATED COUNT: 11.8 % (ref 11.5–14.5)
HCT VFR BLD AUTO: 40.7 % (ref 40–54)
HGB BLD-MCNC: 13.6 G/DL (ref 13.5–18)
IMM GRANULOCYTES # BLD AUTO: 0.02 K/UL (ref 0–0.04)
IMM GRANULOCYTES NFR BLD: 0.3 % (ref 0–0.4)
INR BLD: 1.12 (ref 0.9–1.1)
LYMPHOCYTES # BLD AUTO: 1.72 K/UL (ref 1–4.8)
LYMPHOCYTES NFR BLD AUTO: 26.1 % (ref 27–41)
MCH RBC QN AUTO: 28.8 PG (ref 27–31)
MCHC RBC AUTO-ENTMCNC: 33.4 G/DL (ref 32–36)
MCV RBC AUTO: 86.2 FL (ref 80–96)
MONOCYTES # BLD AUTO: 0.52 K/UL (ref 0–0.8)
MONOCYTES NFR BLD AUTO: 7.9 % (ref 2–6)
MPC BLD CALC-MCNC: 9 FL (ref 9.4–12.4)
NEUTROPHILS # BLD AUTO: 4.22 K/UL (ref 1.8–7.7)
NEUTROPHILS NFR BLD AUTO: 64 % (ref 53–65)
NRBC # BLD AUTO: 0 X10E3/UL
NRBC, AUTO (.00): 0 %
PLATELET # BLD AUTO: 286 K/UL (ref 150–400)
PROTHROMBIN TIME: 14 SECONDS (ref 11.7–14.7)
RBC # BLD AUTO: 4.72 M/UL (ref 4.6–6.2)
WBC # BLD AUTO: 6.59 K/UL (ref 4.5–11)

## 2022-06-27 PROCEDURE — 36415 COLL VENOUS BLD VENIPUNCTURE: CPT | Performed by: NURSE PRACTITIONER

## 2022-06-27 PROCEDURE — 85025 COMPLETE CBC W/AUTO DIFF WBC: CPT | Performed by: STUDENT IN AN ORGANIZED HEALTH CARE EDUCATION/TRAINING PROGRAM

## 2022-06-27 PROCEDURE — 36415 COLL VENOUS BLD VENIPUNCTURE: CPT | Performed by: STUDENT IN AN ORGANIZED HEALTH CARE EDUCATION/TRAINING PROGRAM

## 2022-06-27 PROCEDURE — 85730 THROMBOPLASTIN TIME PARTIAL: CPT | Performed by: STUDENT IN AN ORGANIZED HEALTH CARE EDUCATION/TRAINING PROGRAM

## 2022-06-27 RX ORDER — DIAZEPAM 5 MG/1
10 TABLET ORAL ONCE
Status: DISCONTINUED | OUTPATIENT
Start: 2022-06-27 | End: 2022-06-28 | Stop reason: HOSPADM

## 2022-06-27 RX ORDER — DIAZEPAM 5 MG/1
10 TABLET ORAL ONCE
Status: DISCONTINUED | OUTPATIENT
Start: 2022-06-27 | End: 2022-06-27

## 2022-06-27 RX ORDER — DIAZEPAM 5 MG/1
5 TABLET ORAL EVERY 6 HOURS PRN
Status: DISCONTINUED | OUTPATIENT
Start: 2022-06-27 | End: 2022-06-28 | Stop reason: HOSPADM

## 2022-06-27 RX ORDER — SODIUM CHLORIDE 450 MG/100ML
INJECTION, SOLUTION INTRAVENOUS ONCE
Status: DISCONTINUED | OUTPATIENT
Start: 2022-06-27 | End: 2022-06-28 | Stop reason: HOSPADM

## 2022-07-01 ENCOUNTER — HOSPITAL ENCOUNTER (EMERGENCY)
Facility: HOSPITAL | Age: 22
Discharge: HOME | End: 2022-07-01
Payer: MEDICAID

## 2022-07-01 VITALS
HEIGHT: 71 IN | SYSTOLIC BLOOD PRESSURE: 114 MMHG | HEART RATE: 91 BPM | OXYGEN SATURATION: 97 % | TEMPERATURE: 99 F | WEIGHT: 188 LBS | BODY MASS INDEX: 26.32 KG/M2 | DIASTOLIC BLOOD PRESSURE: 70 MMHG | RESPIRATION RATE: 18 BRPM

## 2022-07-01 DIAGNOSIS — M79.604 PAIN OF RIGHT LOWER EXTREMITY DUE TO INJURY: ICD-10-CM
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
## 2022-07-01 DIAGNOSIS — S09.90XA MINOR HEAD INJURY, INITIAL ENCOUNTER: ICD-10-CM

## 2022-07-01 PROCEDURE — 99282 EMERGENCY DEPT VISIT SF MDM: CPT | Mod: ,,, | Performed by: REGISTERED NURSE

## 2022-07-01 PROCEDURE — 99284 EMERGENCY DEPT VISIT MOD MDM: CPT | Mod: 25

## 2022-07-01 PROCEDURE — 99282 PR EMERGENCY DEPT VISIT,LEVEL II: ICD-10-PCS | Mod: ,,, | Performed by: REGISTERED NURSE

## 2022-07-01 RX ORDER — TOPIRAMATE 25 MG/1
25 TABLET ORAL 2 TIMES DAILY
COMMUNITY

## 2022-07-01 NOTE — Clinical Note
"Javi Davenportmakenzie Farnsworth was seen and treated in our emergency department on 7/1/2022.  He may return to work on 07/02/2022.       If you have any questions or concerns, please don't hesitate to call.      ANAID Sutton/Petty ONEILL    "
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
## 2022-07-04 ENCOUNTER — TELEPHONE (OUTPATIENT)
Dept: EMERGENCY MEDICINE | Facility: HOSPITAL | Age: 22
End: 2022-07-04
Payer: MEDICAID

## 2022-07-07 ENCOUNTER — OUTSIDE PLACE OF SERVICE (OUTPATIENT)
Dept: CARDIOLOGY | Facility: HOSPITAL | Age: 22
End: 2022-07-07
Payer: COMMERCIAL

## 2022-07-07 PROCEDURE — 93010 PR ELECTROCARDIOGRAM REPORT: ICD-10-PCS | Mod: ,,, | Performed by: INTERNAL MEDICINE

## 2022-07-07 PROCEDURE — 93010 ELECTROCARDIOGRAM REPORT: CPT | Mod: ,,, | Performed by: INTERNAL MEDICINE

## 2022-10-24 DIAGNOSIS — M79.644 FINGER PAIN, RIGHT: Primary | ICD-10-CM

## 2022-10-25 ENCOUNTER — HOSPITAL ENCOUNTER (OUTPATIENT)
Dept: RADIOLOGY | Facility: HOSPITAL | Age: 22
Discharge: HOME | End: 2022-10-25
Attending: NURSE PRACTITIONER
Payer: MEDICAID

## 2022-10-25 DIAGNOSIS — M79.641 RIGHT HAND PAIN: ICD-10-CM

## 2022-10-25 DIAGNOSIS — M79.644 FINGER PAIN, RIGHT: ICD-10-CM

## 2022-10-25 PROBLEM — S62.339A BOXER'S METACARPAL FRACTURE, NECK, CLOSED: Status: ACTIVE | Noted: 2022-10-25

## 2022-10-25 PROCEDURE — 73130 X-RAY EXAM OF HAND: CPT | Mod: 26,RT,, | Performed by: RADIOLOGY

## 2022-10-25 PROCEDURE — 73140 X-RAY EXAM OF FINGER(S): CPT | Mod: TC,59,RT

## 2022-10-25 PROCEDURE — 73140 X-RAY EXAM OF FINGER(S): CPT | Mod: 26,RT,, | Performed by: STUDENT IN AN ORGANIZED HEALTH CARE EDUCATION/TRAINING PROGRAM

## 2022-10-25 PROCEDURE — 73130 X-RAY EXAM OF HAND: CPT | Mod: TC,RT

## 2022-10-25 PROCEDURE — 73140 XR FINGER 2 OR MORE VIEWS RIGHT: ICD-10-PCS | Mod: 26,RT,, | Performed by: STUDENT IN AN ORGANIZED HEALTH CARE EDUCATION/TRAINING PROGRAM

## 2022-10-25 PROCEDURE — 73130 XR HAND COMPLETE 3 VIEW RIGHT: ICD-10-PCS | Mod: 26,RT,, | Performed by: RADIOLOGY

## 2023-05-01 ENCOUNTER — OFFICE VISIT (OUTPATIENT)
Dept: SPINE | Facility: CLINIC | Age: 23
End: 2023-05-01
Payer: MEDICAID

## 2023-05-01 VITALS — BODY MASS INDEX: 26.32 KG/M2 | HEIGHT: 71 IN | WEIGHT: 188 LBS

## 2023-05-01 DIAGNOSIS — M54.16 LUMBAR RADICULOPATHY, CHRONIC: ICD-10-CM

## 2023-05-01 DIAGNOSIS — M54.12 CERVICAL RADICULOPATHY: Primary | ICD-10-CM

## 2023-05-01 DIAGNOSIS — M54.16 LUMBAR RADICULOPATHY: Primary | ICD-10-CM

## 2023-05-01 DIAGNOSIS — M54.12 CERVICAL RADICULOPATHY: ICD-10-CM

## 2023-05-01 PROCEDURE — 99213 OFFICE O/P EST LOW 20 MIN: CPT | Mod: PBBFAC | Performed by: ORTHOPAEDIC SURGERY

## 2023-05-01 PROCEDURE — 99214 PR OFFICE/OUTPT VISIT, EST, LEVL IV, 30-39 MIN: ICD-10-PCS | Mod: 25,S$PBB,, | Performed by: ORTHOPAEDIC SURGERY

## 2023-05-01 PROCEDURE — 3008F PR BODY MASS INDEX (BMI) DOCUMENTED: ICD-10-PCS | Mod: CPTII,,, | Performed by: ORTHOPAEDIC SURGERY

## 2023-05-01 PROCEDURE — 99214 OFFICE O/P EST MOD 30 MIN: CPT | Mod: 25,S$PBB,, | Performed by: ORTHOPAEDIC SURGERY

## 2023-05-01 PROCEDURE — 1159F MED LIST DOCD IN RCRD: CPT | Mod: CPTII,,, | Performed by: ORTHOPAEDIC SURGERY

## 2023-05-01 PROCEDURE — 99406 BEHAV CHNG SMOKING 3-10 MIN: CPT | Mod: S$PBB,,, | Performed by: ORTHOPAEDIC SURGERY

## 2023-05-01 PROCEDURE — 1159F PR MEDICATION LIST DOCUMENTED IN MEDICAL RECORD: ICD-10-PCS | Mod: CPTII,,, | Performed by: ORTHOPAEDIC SURGERY

## 2023-05-01 PROCEDURE — 3008F BODY MASS INDEX DOCD: CPT | Mod: CPTII,,, | Performed by: ORTHOPAEDIC SURGERY

## 2023-05-01 PROCEDURE — 99406 PR TOBACCO USE CESSATION INTERMEDIATE 3-10 MINUTES: ICD-10-PCS | Mod: S$PBB,,, | Performed by: ORTHOPAEDIC SURGERY

## 2023-05-01 RX ORDER — GABAPENTIN 600 MG/1
600 TABLET ORAL 3 TIMES DAILY
Qty: 90 TABLET | Refills: 11 | Status: SHIPPED | OUTPATIENT
Start: 2023-05-01 | End: 2024-04-30

## 2023-05-01 NOTE — PROGRESS NOTES
"  MDM/time:  Greater than 30 minutes spent on this encounter including 10 minutes reviewing imaging and notes, 15 minutes with the patient, 5 minutes documentation    ASSESSMENT:  22 y.o. male with lumbar strain, thoracic kyphoscoliosis     PLAN:  Increase Neurontin to 600 mg.  MRI cervical and lumbar.  Continue to work on smoking cessation    HPI:  22 y.o. male here for repeat evaluation of low back pain.  Complaining of worsening neck, upper and lower back pain.  Reports neck pain radiates into bilateral upper extremities.  Reports he recently completed 2 months of physical therapy for cervical and lumbar spine at [facility]. Has had left shoulder surgery with Dr. David Reese did well with surgery the patient reports over the past month has had increased pain in the left shoulder left arm pain in the shoulder popping out of place.  He reports he has seen the nurse practitioner in orthopedics and was told "there was nothing that could be done".  Denies any new injuries or falls.    Denies bladder bowel incontinence.  Difficulty walking more than 20 ft or standing for prolonged periods of time due to back pain.  Currently taken Tylenol and naproxen as needed for pain.  Has been to physical therapy multiple times in the past with minimal pain relief, does not want anymore PT.  Has been seen by pain management in the past was given a full back brace to wear for kyphosis patient reports he could not wear this brace due to increased pain.  No recent MRI, previously unable to have an MRI due to a vagus nerve stimulator and a loop recorder that he has placed for history of seizures.  He now reports that he is able to disable the stimulator and get an MRI.  No prior spine surgery.  Currently smokes 1 pack of cigarettes per day.    IMAGIN2021 cervical spine xray reviewed showed:   No acute fracture or dislocation.  No dynamic instability.    2021 Thoracic spine xray showed:   On the AP there is thoracic " "curvature to the right.  There is a pulse generator over the left chest.  On the lateral view there is increased thoracic kyphosis.  No fractures or listhesis noted.    4/26/2021 Lumbar spine xray showed:   AP, lateral, flexion/extension views of the lumbar spine reviewed   On the AP there is normal coronal alignment.  There are 5 non-rib-bearing lumbar vertebra.  On the lateral there is maintained lumbar lordosis.  No fractures or listhesis noted. No instability on flexion-extension     Past Medical History:   Diagnosis Date    Asthma     Bipolar disorder     "adjustment disorder"    Coronary artery disease     Digestive disorder     Palpitation     Seizures     Syncope      Past Surgical History:   Procedure Laterality Date    DENTAL SURGERY      INSERTION OF IMPLANTABLE LOOP RECORDER      VAGUS NERVE STIMULATOR INSERTION       Social History     Tobacco Use    Smoking status: Every Day     Packs/day: 1.00     Years: 7.00     Pack years: 7.00     Types: Cigarettes    Smokeless tobacco: Never   Substance Use Topics    Alcohol use: Not Currently    Drug use: Never     Current Outpatient Medications   Medication Instructions    ABILIFY MAINTENA 400 mg, Intramuscular, Every 28 days    EPINEPHrine (EPIPEN JR) 0.15 mg, Intramuscular, As needed (PRN)    gabapentin (NEURONTIN) 300 mg, Oral, 3 times daily    lamoTRIgine (LAMICTAL) 100 mg, Oral, 2 times daily    ondansetron (ZOFRAN) 8 mg, Oral, Every 6 hours PRN    pantoprazole (PROTONIX) 40 mg, Oral, 2 times daily    topiramate (TOPAMAX) 25 mg, Oral, 2 times daily    verapamiL (VERELAN) 180 mg, Oral, Daily     EXAM:  Constitutional  General Appearance: BMI 29.29 over weight, NAD  Psychiatric   Orientation: Oriented to time, oriented to place, oriented to person  Mood and Affect: Active and alert, normal mood, normal affect  Gait and Station   Appearance:  Normal gait, normal tandem gait, able to walk on toes, able to walk on heels  CERVICAL  Musculoskeletal " System  Shoulder:  normal appearance, no instability, no tenderness, painful decreased ROM right, normal ROM left    Cervical Spine  Inspection:  alignment normal, no muscle atrophy  Soft Tissue Palpation on the Right:  no tenderness of the paracervicals, no tenderness of the trapezius, no tenderness of the rhomboid  Soft Tissue Palpation on the Left:  no tenderness of the paracervicals, no tenderness of the trapezius, no tenderness of the rhomboid  Bony Palpation:  no tenderness of the occipital protuberance  Active Range:     Flexion decreased, Extension decreased, Rotation to the left normal, Rotation to the right normal, Lateral flexion to the left normal, Lateral flexion to the right normal   No pain elicited on motion    Motor Strength  C5 on the right:  abduction deltoid 5/5  C5 on the left:  abduction deltoid 3/5  C6 on the Right:  flexion biceps 5/5, wrist extension 5/5  C6 on the Left:  flexion biceps 4/5, wrist extension 4/5  C7 on the Right:  extension fingers 5/5, extension triceps 5/5  C7 on the Left:  extension fingers 4/5, extension triceps 4/5  C8 on the Right:  flexion fingers 5/5  C8 on the Left:  flexion fingers 4/5  T1 on the Right:  abduction fingers 5/5  T1 on the Left:  abduction fingers 4/5    Neurological System  Sensation on the Right:  normal sensation of the extremities: right, normal median nerve distribution, normal ulnar nerve distribution  Sensation on the Left:  normal sensation of the extremities: left, normal median nerve distribution, normal ulnar nerve distribution  Biceps Reflex Right:  normal, Brachioradialis Reflex Right:  normal, Triceps Reflex Right: normal  Biceps Reflex Left:  normal, Brachioradialis Reflex Left: normal, Triceps Reflex Left:  normal  Special Test on the Right:  Spurlings test negative, Hoffmans reflex absent, no ankle clonus, Durkan test negative, Tinels sign negative at the elbow  Special Test on the Left:  Spurlings test negative, Hoffmans reflex  absent, no ankle clonus, Durkan test negative, Tinels sign negative at the elbow    Skin:   Head and Neck:  normal   Right Upper Extremity:  normal   Left Upper Extremity:  normal    Cardiovascular:   Arterial Pulses Right:  radial right   Arterial Pulses Left:  radial left   Edema Right:  none   Edema Left:  none    Thoracic Spine   Inspection:  Normal alignment, no overlying skin changes  Bony Palpation:  No tenderness of the spinous process  Soft Tissue Palpation: No tenderness of the paraspinals left, no tenderness of the paraspinals right  Active Range of Motion:  extension normal, flexion normal    Motor Strength   L1 Motor Strength on the Right:  Hip flexion iliopsoas 5/5    L1 Motor Strength on the Left:  Hip flexion iliopsoas 5/5  L2-L4 Motor Strength on the Right:  Knee extension quadriceps 5/5, tibialis anterior 5/5  L2-L4 Motor Strength on the Left:  Knee extension quadriceps 5/5, tibialis anterior 5/5   L5 Motor Strength on the Right:  Extensor halluces longus 5/5,    L5 Motor Strength on the Left:  Extensor halluces longus 5/5,    S1 Motor Strength on the Right:  Plantar flexion gastrocnemius 5/5   S1 Motor Strength on the Left:  Plantar flexion gastrocnemius 5/5    Neurological System   Ankle Reflex Right:  normal   Ankle Reflex Left: normal   Knee Reflex Right:  normal   Knee Reflex Left:  normal   Sensation on the Right:  L2 normal, L3 normal, L4 normal, L5 normal, S1 normal   Sensation on the Left:  L2 normal, L3 normal, L4 normal, L5 normal, S1 normal  Special Test on the Right:  Seated straight leg raising test negative, no clonus of the ankle  Special Test on the Left:  Seated straight leg raising test negative, no clonus of the ankle    Skin   Lumbosacral Spine:  Normal skin    Cardiovascular System   Arterial Pulses Right:  Posterior tibialis normal, dorsalis pedis normal, popliteal normal   Arterial Pulses Left:  Posterior tibialis normal, dorsalis pedis normal, popliteal normal   Edema  Right: None   Edema Left:  None

## 2023-05-01 NOTE — PROGRESS NOTES
Smoking Cessation counseling    Time spent:  3-10 minutes ([id number])    We discussed the importance, over both the short and long-term, of smoking cessation; reviewed the patient's willingness to quit; overall history and current use of tobacco smoking products; that there are a variety of methods to help with smoking diminution and cessation (stopping alone, using nicotine substitution medications/gums, Chantix, other medications, etcetera, which the patient will also discuss with his or her primary care physician); that the patient should set a date for smoking cessation; and the patient will follow up with his or her primary care physician for further support and oversight.    We also discussed that for the patient to have surgery while using nicotine, wound healing may be compromised relative to those who do not smoke; that recovery from anesthesia may sometimes be more difficult; and that quitting may decrease the heart, vascular, pulmonary effects in the short term as well as over the long term.  Smoking cessation may be useful and important for any patient who will have a fusion as part of surgery or have bone or tissue that has regrown heal (bone fusions, wound closures, etc.)  since surgical results with respect to wound healing, susceptibility to recovery from infection, bone fusion, and tissue and blood vessel health may be impaired or less optimal in smokers than nonsmokers.  We talked about the elevated risk of surgical bone fusion failure in the setting of smoking and the potential need for a higher-risk revision surgery should fusion not occur.    I reviewed this with the patient in detail and all questions were answered.  We discussed nature of the patient's condition; real alternatives and realistic options; pros and cons, risks and benefits of all the options, in detail.  I believe the patient understands the above and wishes to proceed as outlined.

## 2023-05-19 ENCOUNTER — TELEPHONE (OUTPATIENT)
Dept: SPINE | Facility: CLINIC | Age: 23
End: 2023-05-19
Payer: MEDICAID

## 2023-05-19 ENCOUNTER — DOCUMENTATION ONLY (OUTPATIENT)
Dept: SPINE | Facility: CLINIC | Age: 23
End: 2023-05-19
Payer: MEDICAID

## 2023-05-19 NOTE — PROGRESS NOTES
FROM [employer]      [facility] ([facility]) on behalf of [employer] ([employer]) received a  request for Javi CHRIS McduffieFarnsworth. The request on May 17, 2023 of a(n) Lumbar Spine MRI  (Magnetic Resonance Imaging - pictures of inside your lower spine) was denied.  The reason for this decision is The notes given do not meet [facility]'s Lumbar Spine MRI  guidelines. Thus, the procedure is not shown to be medically needed. A physician reviewer  made this decision based on the notes given (you have back pain). Prior to an approval, the  following notes should be given: doctor's notes that say you did six weeks of back exercises  (physical therapy, chiropractic treatments, or medically directed home exercise program) in  the last six months. We also need to know the number of visits you went to. If you did this,  the notes do not show the dates that you did the exercises. We also need to know that you  did not get better. The Lumbar report say the same thing!

## 2023-05-19 NOTE — TELEPHONE ENCOUNTER
I have shown Dr. Lennon the information about the MRI denial. He states that patient needs to do PT before MRI can be done.   have attempted to contact patient on all phone numbers, multiple times. No answers. I have left voicemail with the following info:    MRIs have been denied by insurance company. I will have to cancel MRIs and f/u appts. Insurance requires that he complete 6 weeks of PT before MRI can be done. Patient needs to let us know where he would like to do PT and then would need to follow up after he completes PT.

## 2023-05-22 DIAGNOSIS — M54.12 CERVICAL RADICULOPATHY: ICD-10-CM

## 2023-05-22 DIAGNOSIS — M54.16 LUMBAR RADICULOPATHY: Primary | ICD-10-CM

## 2023-06-30 DIAGNOSIS — M54.16 LUMBAR RADICULOPATHY: Primary | ICD-10-CM

## 2023-07-21 DIAGNOSIS — M25.512 LEFT SHOULDER PAIN, UNSPECIFIED CHRONICITY: Primary | ICD-10-CM

## 2023-07-25 ENCOUNTER — HOSPITAL ENCOUNTER (OUTPATIENT)
Dept: RADIOLOGY | Facility: HOSPITAL | Age: 23
Discharge: HOME | End: 2023-07-25
Attending: NURSE PRACTITIONER
Payer: MEDICAID

## 2023-07-25 DIAGNOSIS — M25.512 LEFT SHOULDER PAIN, UNSPECIFIED CHRONICITY: ICD-10-CM

## 2023-08-09 DIAGNOSIS — M54.16 LUMBAR RADICULOPATHY: Primary | ICD-10-CM

## 2023-09-18 DIAGNOSIS — M25.512 LEFT SHOULDER PAIN, UNSPECIFIED CHRONICITY: Primary | ICD-10-CM

## 2023-10-19 ENCOUNTER — OFFICE VISIT (OUTPATIENT)
Dept: SPINE | Facility: CLINIC | Age: 23
End: 2023-10-19
Payer: MEDICAID

## 2023-10-19 DIAGNOSIS — M54.12 CERVICAL RADICULOPATHY: Primary | ICD-10-CM

## 2023-10-19 DIAGNOSIS — M54.2 CERVICAL SPINE PAIN: ICD-10-CM

## 2023-10-19 DIAGNOSIS — M54.16 LUMBAR RADICULOPATHY, CHRONIC: ICD-10-CM

## 2023-10-19 PROCEDURE — 99406 PR TOBACCO USE CESSATION INTERMEDIATE 3-10 MINUTES: ICD-10-PCS | Mod: S$PBB,,, | Performed by: ORTHOPAEDIC SURGERY

## 2023-10-19 PROCEDURE — 1159F MED LIST DOCD IN RCRD: CPT | Mod: CPTII,,, | Performed by: ORTHOPAEDIC SURGERY

## 2023-10-19 PROCEDURE — 99214 PR OFFICE/OUTPT VISIT, EST, LEVL IV, 30-39 MIN: ICD-10-PCS | Mod: 25,S$PBB,, | Performed by: ORTHOPAEDIC SURGERY

## 2023-10-19 PROCEDURE — 99213 OFFICE O/P EST LOW 20 MIN: CPT | Mod: PBBFAC | Performed by: ORTHOPAEDIC SURGERY

## 2023-10-19 PROCEDURE — 99406 BEHAV CHNG SMOKING 3-10 MIN: CPT | Mod: S$PBB,,, | Performed by: ORTHOPAEDIC SURGERY

## 2023-10-19 PROCEDURE — 1159F PR MEDICATION LIST DOCUMENTED IN MEDICAL RECORD: ICD-10-PCS | Mod: CPTII,,, | Performed by: ORTHOPAEDIC SURGERY

## 2023-10-19 PROCEDURE — 99214 OFFICE O/P EST MOD 30 MIN: CPT | Mod: 25,S$PBB,, | Performed by: ORTHOPAEDIC SURGERY

## 2023-10-19 NOTE — PROGRESS NOTES
MDM/time:  Greater than 30 minutes spent on this encounter including 10 minutes reviewing imaging and notes, 15 minutes with the patient, 5 minutes documentation    ASSESSMENT:  22 y.o. male with lumbar strain, thoracic kyphoscoliosis     PLAN:  MRI cervical and lumbar.  Continue to work on smoking cessation.  Referral to pain management    MRI/CT myelogram authorization checklist:  Indication for MRI/CT Myelogram:  Patient has radiculopathy/claudication that is refractory to appropriate conservative treatment    Prior conservative treatment:  Patient attempted and was unable to complete physical therapy due to worsening pain and Patient has tried NSAIDs and neuromodulators (neurontin, lyrica, elavil, or cymbalta)    HPI:  22 y.o. male here for repeat evaluation of neck and low back pain.  Complaining of worsening neck, upper and lower back pain.  Reports neck pain radiates into bilateral upper extremities.  He says at in the last month he has attempted physical therapy for cervical and lumbar spine at [facility] but this was making his pain worse and he was unable to tolerate it. Has had left shoulder surgery with Dr. David Reese.  Denies any new injuries or falls.    Denies bladder bowel incontinence.  Difficulty walking more than 20 ft or standing for prolonged periods of time due to back pain.  Currently taken Tylenol and naproxen as needed for pain.  Has been seen by pain management in the past was given a full back brace to wear for kyphosis patient reports he could not wear this brace due to increased pain.  No recent MRI, previously unable to have an MRI due to a vagus nerve stimulator and a loop recorder that he has placed for history of seizures.  He now reports that he is able to disable the stimulator and get an MRI.  No prior spine surgery.  Currently smokes 1 pack of cigarettes per day.    IMAGIN2021 cervical spine xray reviewed showed:   No acute fracture or dislocation.  No dynamic  "instability.    4/26/2021 Thoracic spine xray showed:   On the AP there is thoracic curvature to the right.  There is a pulse generator over the left chest.  On the lateral view there is increased thoracic kyphosis.  No fractures or listhesis noted.    4/26/2021 Lumbar spine xray showed:   AP, lateral, flexion/extension views of the lumbar spine reviewed   On the AP there is normal coronal alignment.  There are 5 non-rib-bearing lumbar vertebra.  On the lateral there is maintained lumbar lordosis.  No fractures or listhesis noted. No instability on flexion-extension     Past Medical History:   Diagnosis Date    Asthma     Bipolar disorder     "adjustment disorder"    Coronary artery disease     Digestive disorder     Palpitation     Seizures     Syncope      Past Surgical History:   Procedure Laterality Date    DENTAL SURGERY      INSERTION OF IMPLANTABLE LOOP RECORDER      VAGUS NERVE STIMULATOR INSERTION       Social History     Tobacco Use    Smoking status: Every Day     Current packs/day: 1.00     Average packs/day: 1 pack/day for 7.0 years (7.0 ttl pk-yrs)     Types: Cigarettes    Smokeless tobacco: Never   Substance Use Topics    Alcohol use: Not Currently    Drug use: Never     Current Outpatient Medications   Medication Instructions    ABILIFY MAINTENA 400 mg, Intramuscular, Every 28 days    EPINEPHrine (EPIPEN JR) 0.15 mg, Intramuscular, As needed (PRN)    gabapentin (NEURONTIN) 600 mg, Oral, 3 times daily    lamoTRIgine (LAMICTAL) 100 mg, Oral, 2 times daily    ondansetron (ZOFRAN) 8 mg, Oral, Every 6 hours PRN    pantoprazole (PROTONIX) 40 mg, Oral, 2 times daily    topiramate (TOPAMAX) 25 mg, Oral, 2 times daily    verapamiL (VERELAN) 180 mg, Oral, Daily     EXAM:  Constitutional  General Appearance: BMI 29.29 over weight, NAD  Psychiatric   Orientation: Oriented to time, oriented to place, oriented to person  Mood and Affect: Active and alert, normal mood, normal affect  Gait and Station   Appearance:  " Normal gait, normal tandem gait, able to walk on toes, able to walk on heels  CERVICAL  Musculoskeletal System  Shoulder:  normal appearance, no instability, no tenderness, painful decreased ROM right, normal ROM left    Cervical Spine  Inspection:  alignment normal, no muscle atrophy  Soft Tissue Palpation on the Right:  no tenderness of the paracervicals, no tenderness of the trapezius, no tenderness of the rhomboid  Soft Tissue Palpation on the Left:  no tenderness of the paracervicals, no tenderness of the trapezius, no tenderness of the rhomboid  Bony Palpation:  no tenderness of the occipital protuberance  Active Range:     Flexion decreased, Extension decreased, Rotation to the left normal, Rotation to the right normal, Lateral flexion to the left normal, Lateral flexion to the right normal   No pain elicited on motion    Motor Strength  C5 on the right:  abduction deltoid 5/5  C5 on the left:  abduction deltoid 3/5  C6 on the Right:  flexion biceps 5/5, wrist extension 5/5  C6 on the Left:  flexion biceps 4/5, wrist extension 4/5  C7 on the Right:  extension fingers 5/5, extension triceps 5/5  C7 on the Left:  extension fingers 4/5, extension triceps 4/5  C8 on the Right:  flexion fingers 5/5  C8 on the Left:  flexion fingers 4/5  T1 on the Right:  abduction fingers 5/5  T1 on the Left:  abduction fingers 4/5    Neurological System  Sensation on the Right:  normal sensation of the extremities: right, normal median nerve distribution, normal ulnar nerve distribution  Sensation on the Left:  normal sensation of the extremities: left, normal median nerve distribution, normal ulnar nerve distribution  Biceps Reflex Right:  normal, Brachioradialis Reflex Right:  normal, Triceps Reflex Right: normal  Biceps Reflex Left:  normal, Brachioradialis Reflex Left: normal, Triceps Reflex Left:  normal  Special Test on the Right:  Spurlings test negative, Hoffmans reflex absent, no ankle clonus, Durkan test negative,  Tinels sign negative at the elbow  Special Test on the Left:  Spurlings test negative, Hoffmans reflex absent, no ankle clonus, Durkan test negative, Tinels sign negative at the elbow    Skin:   Head and Neck:  normal   Right Upper Extremity:  normal   Left Upper Extremity:  normal    Cardiovascular:   Arterial Pulses Right:  radial right   Arterial Pulses Left:  radial left   Edema Right:  none   Edema Left:  none    Thoracic Spine   Inspection:  Normal alignment, no overlying skin changes  Bony Palpation:  No tenderness of the spinous process  Soft Tissue Palpation: No tenderness of the paraspinals left, no tenderness of the paraspinals right  Active Range of Motion:  extension normal, flexion normal    Motor Strength   L1 Motor Strength on the Right:  Hip flexion iliopsoas 5/5    L1 Motor Strength on the Left:  Hip flexion iliopsoas 5/5  L2-L4 Motor Strength on the Right:  Knee extension quadriceps 5/5, tibialis anterior 5/5  L2-L4 Motor Strength on the Left:  Knee extension quadriceps 5/5, tibialis anterior 5/5   L5 Motor Strength on the Right:  Extensor halluces longus 5/5,    L5 Motor Strength on the Left:  Extensor halluces longus 5/5,    S1 Motor Strength on the Right:  Plantar flexion gastrocnemius 5/5   S1 Motor Strength on the Left:  Plantar flexion gastrocnemius 5/5    Neurological System   Ankle Reflex Right:  normal   Ankle Reflex Left: normal   Knee Reflex Right:  normal   Knee Reflex Left:  normal   Sensation on the Right:  L2 normal, L3 normal, L4 normal, L5 normal, S1 normal   Sensation on the Left:  L2 normal, L3 normal, L4 normal, L5 normal, S1 normal  Special Test on the Right:  Seated straight leg raising test negative, no clonus of the ankle  Special Test on the Left:  Seated straight leg raising test negative, no clonus of the ankle    Skin   Lumbosacral Spine:  Normal skin    Cardiovascular System   Arterial Pulses Right:  Posterior tibialis normal, dorsalis pedis normal, popliteal  normal   Arterial Pulses Left:  Posterior tibialis normal, dorsalis pedis normal, popliteal normal   Edema Right: None   Edema Left:  None

## 2023-10-19 NOTE — PROGRESS NOTES
Smoking Cessation counseling    Time spent:  3-10 minutes ([id number])    We discussed the importance, over both the short and long-term, of smoking cessation; reviewed the patient's willingness to quit; overall history and current use of tobacco smoking products; that there are a variety of methods to help with smoking diminution and cessation (stopping alone, using nicotine substitution medications/gums, Chantix, other medications, etcetera, which the patient will also discuss with his or her primary care physician); that the patient should set a date for smoking cessation; and the patient will follow up with his or her primary care physician for further support and oversight.    We also discussed that for the patient to have surgery while using nicotine, wound healing may be compromised relative to those who do not smoke; that recovery from anesthesia may sometimes be more difficult; and that quitting may decrease the heart, vascular, pulmonary effects in the short term as well as over the long term.  Smoking cessation may be useful and important for any patient who will have a fusion as part of surgery or have bone or tissue that has regrown heal (bone fusions, wound closures, etc.)  since surgical results with respect to wound healing, susceptibility to recovery from infection, bone fusion, and tissue and blood vessel health may be impaired or less optimal in smokers than nonsmokers.  We talked about the elevated risk of surgical bone fusion failure in the setting of smoking and the potential need for a higher-risk revision surgery should fusion not occur.    I reviewed this with the patient in detail and all questions were answered.  We discussed nature of the patient's condition; real alternatives and realistic options; pros and cons, risks and benefits of all the options, in detail.  I believe the patient understands the above and wishes to proceed as outlined.

## 2023-10-25 ENCOUNTER — TELEPHONE (OUTPATIENT)
Dept: ORTHOPEDICS | Facility: CLINIC | Age: 23
End: 2023-10-25
Payer: MEDICAID

## 2023-10-25 NOTE — TELEPHONE ENCOUNTER
----- Message from Renee Zuniga sent at 10/25/2023 10:12 AM CDT -----   [facility] MRI THAT HE HAD DONE [phone] MOTHER NEED FOR YOU TO CALL HER SHE HAS SOME QUESTION

## 2023-10-27 ENCOUNTER — TELEPHONE (OUTPATIENT)
Dept: SPINE | Facility: CLINIC | Age: 23
End: 2023-10-27
Payer: MEDICAID

## 2023-10-27 DIAGNOSIS — M54.12 CERVICAL RADICULOPATHY: ICD-10-CM

## 2023-10-27 DIAGNOSIS — M54.16 LUMBAR RADICULOPATHY, CHRONIC: Primary | ICD-10-CM

## 2023-10-27 NOTE — TELEPHONE ENCOUNTER
Imaging center here is unable to accommodate MRI d/t vagus nerve stimulator. Patient is requesting it be done at [facility]. ----- Message from Leeann Gonzalez sent at 10/26/2023  2:35 PM CDT -----  Ismaels Collette to call [phone]. He said he talked with you yesterday.

## 2023-11-07 ENCOUNTER — TELEPHONE (OUTPATIENT)
Dept: SPINE | Facility: CLINIC | Age: 23
End: 2023-11-07
Payer: MEDICAID

## 2023-11-07 NOTE — TELEPHONE ENCOUNTER
Patient has a vagus nerve stimulator I have attempted to schedule his MRI here, at [facility], also at [facility], as well as [facility]. All locations state that they have no one that can put the vagus nerve stim in the mode for the MRI. Patient will need to have MRI in [place] and have it set up for neurologist there to put his stimulator in the correct mode. I discussed this with Nidia. She verbalizes understanding and she states that she will call back when/if they are willing to schedule it in [place].

## 2023-11-23 ENCOUNTER — HOSPITAL ENCOUNTER (EMERGENCY)
Facility: HOSPITAL | Age: 23
Discharge: HOME | End: 2023-11-23
Payer: MEDICAID

## 2023-11-23 VITALS
DIASTOLIC BLOOD PRESSURE: 61 MMHG | HEIGHT: 71 IN | OXYGEN SATURATION: 98 % | SYSTOLIC BLOOD PRESSURE: 126 MMHG | HEART RATE: 102 BPM | WEIGHT: 159 LBS | BODY MASS INDEX: 22.26 KG/M2 | RESPIRATION RATE: 16 BRPM | TEMPERATURE: 98 F

## 2023-11-23 DIAGNOSIS — R51.9 ACUTE NONINTRACTABLE HEADACHE, UNSPECIFIED HEADACHE TYPE: Primary | ICD-10-CM

## 2023-11-23 LAB
AMPHET UR QL SCN: NEGATIVE
BARBITURATES UR QL SCN: NEGATIVE
BENZODIAZ METAB UR QL SCN: NEGATIVE
CANNABINOIDS UR QL SCN: NEGATIVE
COCAINE UR QL SCN: NEGATIVE
OPIATES UR QL SCN: NEGATIVE
PCP UR QL SCN: NEGATIVE

## 2023-11-23 PROCEDURE — 99283 PR EMERGENCY DEPT VISIT,LEVEL III: ICD-10-PCS | Mod: ,,, | Performed by: REGISTERED NURSE

## 2023-11-23 PROCEDURE — 80307 DRUG TEST PRSMV CHEM ANLYZR: CPT | Performed by: REGISTERED NURSE

## 2023-11-23 PROCEDURE — 99283 EMERGENCY DEPT VISIT LOW MDM: CPT | Mod: ,,, | Performed by: REGISTERED NURSE

## 2023-11-23 PROCEDURE — 99284 EMERGENCY DEPT VISIT MOD MDM: CPT | Mod: 25

## 2023-11-23 NOTE — ED TRIAGE NOTES
Rec'd 24 y/o M via W/C to ED#3 w/ c/o HA x 3 days, dizziness x 1 week, left sided numbness that started at approximately 1615 this evening.

## 2023-11-24 NOTE — ED PROVIDER NOTES
"Encounter Date: 11/23/2023       History     Chief Complaint   Patient presents with    Headache    Numbness     Left sided numbness    Dizziness     23 y-old  male received to ED with complaint of a headache that has been ongoing for > 1 week. States that his symptoms got worse 3 days PTA. States that she also started having some numbness/tingling to his LLE that started around 1530 this afternoon. Denies any new low back pain or problems with bowel or bladder. Patient with a history of epilepsy, chronic back pain, Bipolar, and "digestive disorder." Patient is AAOX3, Eyes MIRZA, Speech clear/appropriate, CHAN without any difficulty. Does not appear to be in any distress.       Review of patient's allergies indicates:   Allergen Reactions    Pseudoephedrine Itching and Other (See Comments)     hands  Numbness to hands  seizure      Aspirin Hives     Other reaction(s): Unknown    Dimetapp cold and flu     Red dye Other (See Comments)     seizure  seizure      Sudafed cold-allergy Hives     Past Medical History:   Diagnosis Date    Asthma     Bipolar disorder     "adjustment disorder"    Coronary artery disease     Digestive disorder     Palpitation     Seizures     Syncope      Past Surgical History:   Procedure Laterality Date    DENTAL SURGERY      INSERTION OF IMPLANTABLE LOOP RECORDER      KNEE SURGERY Left     SHOULDER SURGERY Left     VAGUS NERVE STIMULATOR INSERTION       Family History   Problem Relation Age of Onset    Heart disease Maternal Grandmother     Stroke Maternal Grandmother     Heart attack Maternal Grandmother      Social History     Tobacco Use    Smoking status: Every Day     Current packs/day: 1.00     Average packs/day: 1 pack/day for 7.0 years (7.0 ttl pk-yrs)     Types: Cigarettes    Smokeless tobacco: Never   Substance Use Topics    Alcohol use: Not Currently    Drug use: Never     Review of Systems   Constitutional: Negative.    HENT: Negative.     Eyes: Negative.    Respiratory: " Negative.     Cardiovascular: Negative.    Gastrointestinal: Negative.    Endocrine: Negative.    Musculoskeletal: Negative.    Skin: Negative.    Allergic/Immunologic: Negative.    Neurological:  Positive for dizziness, seizures, numbness and headaches.   Hematological: Negative.    Psychiatric/Behavioral: Negative.         Physical Exam     Initial Vitals [11/23/23 1754]   BP Pulse Resp Temp SpO2   126/61 102 16 98.4 °F (36.9 °C) 98 %      MAP       --         Physical Exam    Nursing note and vitals reviewed.  Constitutional: He appears well-developed and well-nourished.   HENT:   Head: Normocephalic and atraumatic.   Right Ear: External ear normal.   Left Ear: External ear normal.   Nose: Nose normal.   Mouth/Throat: Oropharynx is clear and moist.   Eyes: Conjunctivae are normal.   Neck: Neck supple.   Normal range of motion.  Cardiovascular:  Normal rate, regular rhythm, normal heart sounds and intact distal pulses.           Pulmonary/Chest: Breath sounds normal.   Abdominal: Abdomen is soft. Bowel sounds are normal.   Musculoskeletal:         General: Normal range of motion.      Cervical back: Normal range of motion and neck supple.     Neurological: He is alert and oriented to person, place, and time. He has normal strength. GCS score is 15. GCS eye subscore is 4. GCS verbal subscore is 5. GCS motor subscore is 6.   Skin: Skin is warm and dry. Capillary refill takes less than 2 seconds.   Psychiatric: He has a normal mood and affect. His behavior is normal. Judgment and thought content normal.         Medical Screening Exam   See Full Note    ED Course   Procedures  Labs Reviewed   DRUG SCREEN, URINE (BEAKER) - Normal          Imaging Results              CT Head Without Contrast (Final result)  Result time 11/23/23 19:17:53      Final result by Rudi Villalobos MD (11/23/23 19:17:53)                   Impression:      No acute intracranial process      Electronically signed by: Rudi  "Castrolonnie  Date:    11/23/2023  Time:    19:17               Narrative:    EXAMINATION:  CT head without contrast    CLINICAL HISTORY:  Headache, chronic, new features or increased frequency;    TECHNIQUE:  Transaxial CT sections were obtained through the brain without contrast.    The CT examination was performed using one or more of the following dose reduction techniques: Automated exposure control, adjustment of the mA and kV according to patient's size, use of acute or iterative reconstruction techniques.    COMPARISON:  July 1, 2022 CT head    FINDINGS:  The ventricles are midline in position without evidence of hydrocephalus. There is no mass or area of parenchymal hemorrhage. There is no gross CT evidence of acute cortical stroke. There is no extra-axial hematoma. The partially visualized sinuses are generally clear. There is no obvious skull fracture.                                       Medications - No data to display  Medical Decision Making  23 y-old  male received to ED with complaint of a headache that has been ongoing for > 1 week. States that his symptoms got worse 3 days PTA. States that she also started having some numbness/tingling to his LLE that started around 1530 this afternoon. Denies any new low back pain or problems with bowel or bladder. Patient with a history of epilepsy, chronic back pain, Bipolar, and "digestive disorder." Patient is AAOX3, Eyes MIRZA, Speech clear/appropriate, CHAN without any difficulty. Does not appear to be in any distress.       Amount and/or Complexity of Data Reviewed  Radiology: ordered.     Details: CT head negative for acute process    Risk  Risk Details: Discussed CT results with patient at length. Patient states that he is tired of waiting and wishes to go home. Patient instructed to F/u with his PCP. Patient walked out of the ED with a normal gait.                                    Clinical Impression:   Final diagnoses:  [R51.9] Acute " nonintractable headache, unspecified headache type (Primary)        ED Disposition Condition    Discharge Stable          ED Prescriptions    None       Follow-up Information       Follow up With Specialties Details Why Contact Info    Stefani Vang., DO Family Medicine Schedule an appointment as soon as possible for a visit in 1 day As needed [address] [place] [address]  [place] [address]  [phone]               Anand Ayala NP-C  11/23/23 1934

## 2024-04-30 DIAGNOSIS — M54.16 LUMBAR RADICULOPATHY, CHRONIC: Primary | ICD-10-CM

## 2024-06-27 DIAGNOSIS — R19.7 DIARRHEA, UNSPECIFIED: ICD-10-CM

## 2024-06-27 DIAGNOSIS — R11.0 NAUSEA: ICD-10-CM

## 2024-06-27 DIAGNOSIS — R63.4 ABNORMAL WEIGHT LOSS: Primary | ICD-10-CM

## 2024-06-27 DIAGNOSIS — R11.10 VOMITING, UNSPECIFIED: ICD-10-CM

## 2024-08-22 ENCOUNTER — HOSPITAL ENCOUNTER (OUTPATIENT)
Dept: CARDIOLOGY | Facility: HOSPITAL | Age: 24
Discharge: HOME | End: 2024-08-22
Attending: INTERNAL MEDICINE
Payer: MEDICAID

## 2024-08-22 ENCOUNTER — OFFICE VISIT (OUTPATIENT)
Dept: CARDIOLOGY | Facility: CLINIC | Age: 24
End: 2024-08-22
Payer: MEDICAID

## 2024-08-22 VITALS
HEART RATE: 70 BPM | DIASTOLIC BLOOD PRESSURE: 56 MMHG | SYSTOLIC BLOOD PRESSURE: 92 MMHG | BODY MASS INDEX: 21.84 KG/M2 | WEIGHT: 156 LBS | HEIGHT: 71 IN | OXYGEN SATURATION: 95 %

## 2024-08-22 DIAGNOSIS — G40.909 SEIZURE DISORDER: ICD-10-CM

## 2024-08-22 DIAGNOSIS — R00.2 INTERMITTENT PALPITATIONS: ICD-10-CM

## 2024-08-22 DIAGNOSIS — I47.10 SVT (SUPRAVENTRICULAR TACHYCARDIA): ICD-10-CM

## 2024-08-22 DIAGNOSIS — F31.9 BIPOLAR 1 DISORDER: ICD-10-CM

## 2024-08-22 DIAGNOSIS — F20.0 PARANOID SCHIZOPHRENIA: ICD-10-CM

## 2024-08-22 DIAGNOSIS — I47.10 SVT (SUPRAVENTRICULAR TACHYCARDIA): Primary | ICD-10-CM

## 2024-08-22 DIAGNOSIS — Z91.148 NONCOMPLIANCE W/MEDICATION TREATMENT DUE TO INTERMIT USE OF MEDICATION: ICD-10-CM

## 2024-08-22 DIAGNOSIS — Z72.0 TOBACCO ABUSE DISORDER: ICD-10-CM

## 2024-08-22 PROCEDURE — 93246 EXT ECG>7D<15D RECORDING: CPT

## 2024-08-22 PROCEDURE — 1159F MED LIST DOCD IN RCRD: CPT | Mod: CPTII,,, | Performed by: INTERNAL MEDICINE

## 2024-08-22 PROCEDURE — 99205 OFFICE O/P NEW HI 60 MIN: CPT | Mod: ,,, | Performed by: INTERNAL MEDICINE

## 2024-08-22 PROCEDURE — 3074F SYST BP LT 130 MM HG: CPT | Mod: CPTII,,, | Performed by: INTERNAL MEDICINE

## 2024-08-22 PROCEDURE — 3078F DIAST BP <80 MM HG: CPT | Mod: CPTII,,, | Performed by: INTERNAL MEDICINE

## 2024-08-22 PROCEDURE — 3008F BODY MASS INDEX DOCD: CPT | Mod: CPTII,,, | Performed by: INTERNAL MEDICINE

## 2024-08-22 RX ORDER — GABAPENTIN 100 MG/1
100 CAPSULE ORAL DAILY
COMMUNITY
Start: 2024-04-08

## 2024-08-22 NOTE — PROGRESS NOTES
PCP: Stefani Vang DO    Referring Provider:     Subjective:   Javi Farnsworth is a 23 y.o. male with hx of palpitations,  who presents for evaluation of palpitations.     Pt reports sensation of heart running fast upon awakening in AM, as soon as he stands up.  Sensation lasts up to ten minutes, if he stands up and waits for it to pass, sometimes sits back down and feels better.  He is active playing with kids, he is on disability for seizure disorder, present since age 7,  He reports last seizure approximately one month ago.  He has monitor in his chest for seizures, and a loop recorder.   He has not followed up for loop recorder since 2019.  He is actively smoking 1 pack a day, no marijuana, no recreational drugs, rare ETOH.  He reports chest pain, mid sternal, radiates into left chest wall, lasts five to ten minutes, associated with nausea and diaphoresis.  Most severe event two or three weeks ago, was having chest pain and dizziness, was evaluated in [facility] ER, was discharged to home care.  He has not followed up.  Pt ran out of verapamil approximately a year ago, notes symptoms have been worse since then.  He is scheduled for vagus nerve stimulator battery replacement.  Pt is changing primary care due to scheduling issues. .          Fhx:  Family History   Problem Relation Name Age of Onset    Heart disease Maternal Grandmother      Stroke Maternal Grandmother      Heart attack Maternal Grandmother        Shx:   Past Surgical History:   Procedure Laterality Date    DENTAL SURGERY      INSERTION OF IMPLANTABLE LOOP RECORDER      KNEE SURGERY Left     SHOULDER SURGERY Left     VAGUS NERVE STIMULATOR INSERTION          EKG - NSR, short WV interval,     ECHO - No results found for this or any previous visit.       CATH - No results found for this or any previous visit.       Stress - No results found for this or any previous visit.       Lab Results   Component Value Date     12/08/2021    K 3.7  "12/08/2021     12/08/2021    CO2 27 12/08/2021    BUN 8 12/08/2021    CREATININE 0.85 12/08/2021    CALCIUM 8.5 12/08/2021    ANIONGAP 14 12/08/2021    ESTGFRAFRICA 106 08/08/2020    EGFRNONAA 115 12/08/2021       No results found for: "CHOL"  No results found for: "HDL"  No results found for: "LDLCALC"  No results found for: "TRIG"  No results found for: "CHOLHDL"    Lab Results   Component Value Date    WBC 6.59 06/27/2022    HGB 13.6 06/27/2022    HCT 40.7 06/27/2022    MCV 86.2 06/27/2022     06/27/2022           Current Outpatient Medications:     gabapentin (NEURONTIN) 100 MG capsule, Take 100 mg by mouth once daily., Disp: , Rfl:     ARIPiprazole (ABILIFY MAINTENA) 400 mg sers syringe, Inject 400 mg into the muscle every 28 days. (Patient not taking: Reported on 8/22/2024), Disp: , Rfl:     EPINEPHrine (EPIPEN JR) 0.15 mg/0.3 mL pen injection, Inject 0.15 mg into the muscle as needed for Anaphylaxis. (Patient not taking: Reported on 8/22/2024), Disp: , Rfl:     lamoTRIgine (LAMICTAL) 100 MG tablet, Take 100 mg by mouth 2 (two) times daily. (Patient not taking: Reported on 8/22/2024), Disp: , Rfl:     ondansetron (ZOFRAN) 4 MG tablet, Take 8 mg by mouth every 6 (six) hours as needed for Nausea. (Patient not taking: Reported on 8/22/2024), Disp: , Rfl:     pantoprazole (PROTONIX) 40 MG tablet, Take 1 tablet (40 mg total) by mouth 2 (two) times daily., Disp: 180 tablet, Rfl: 3    topiramate (TOPAMAX) 25 MG tablet, Take 25 mg by mouth 2 (two) times daily. (Patient not taking: Reported on 8/22/2024), Disp: , Rfl:     verapamiL (VERELAN) 180 MG C24P, Take 180 mg by mouth once daily. (Patient not taking: Reported on 8/22/2024), Disp: , Rfl:     Review of Systems   Constitutional: Positive for malaise/fatigue, night sweats and weight loss. Negative for diaphoresis and weight gain.        Reports unexplained weight loss over last year.    HENT:  Positive for congestion. Negative for ear pain, hearing " "loss, nosebleeds and sore throat.    Eyes:  Positive for blurred vision and photophobia. Negative for double vision, pain and visual disturbance.        Reports is almost blind, unknown cause.    Cardiovascular:  Positive for chest pain, dyspnea on exertion, irregular heartbeat, near-syncope and palpitations. Negative for claudication, leg swelling, orthopnea and syncope.   Respiratory:  Positive for shortness of breath and snoring. Negative for cough, sleep disturbances due to breathing and wheezing.    Endocrine: Negative for cold intolerance, heat intolerance, polydipsia, polyphagia and polyuria.   Hematologic/Lymphatic: Negative for bleeding problem. Does not bruise/bleed easily.   Skin:  Negative for dry skin, flushing, itching, rash and skin cancer.   Musculoskeletal:  Negative for arthritis, back pain, falls, joint pain, muscle cramps, muscle weakness and myalgias.   Gastrointestinal:  Negative for abdominal pain, change in bowel habit, constipation, diarrhea, dysphagia, heartburn, nausea and vomiting.   Genitourinary:  Negative for bladder incontinence, dysuria, flank pain, frequency and nocturia.   Neurological:  Negative for dizziness, focal weakness, headaches, light-headedness, loss of balance, numbness, paresthesias and seizures.   Psychiatric/Behavioral:  Positive for depression and suicidal ideas. Negative for memory loss and substance abuse. The patient is nervous/anxious.         Schizophenic, bipolar, depression, attempeted suicide x 3, last attempt approximately age 14.    Allergic/Immunologic: Negative for environmental allergies.          Objective:   BP (!) 92/56 (BP Location: Right arm, Patient Position: Sitting)   Pulse 70   Ht 5' 11" (1.803 m)   Wt 70.8 kg (156 lb)   SpO2 95%   BMI 21.76 kg/m²       Physical Exam  Vitals and nursing note reviewed.   Constitutional:       Appearance: Normal appearance. He is obese. He is ill-appearing.   HENT:      Head: Normocephalic and atraumatic.     "  Right Ear: External ear normal.      Left Ear: External ear normal.      Nose: Congestion and rhinorrhea present.   Eyes:      General: No scleral icterus.        Right eye: No discharge.         Left eye: No discharge.      Extraocular Movements: Extraocular movements intact.      Conjunctiva/sclera: Conjunctivae normal.      Pupils: Pupils are equal, round, and reactive to light.   Cardiovascular:      Rate and Rhythm: Normal rate and regular rhythm.      Pulses: Normal pulses.      Heart sounds: Normal heart sounds. No murmur heard.     No friction rub. No gallop.   Pulmonary:      Effort: Pulmonary effort is normal.      Breath sounds: Normal breath sounds. No wheezing, rhonchi or rales.   Chest:      Chest wall: No tenderness.   Abdominal:      General: Abdomen is flat. Bowel sounds are normal. There is no distension.      Palpations: Abdomen is soft.      Tenderness: There is no abdominal tenderness. There is no guarding or rebound.   Musculoskeletal:         General: No swelling or tenderness. Normal range of motion.      Cervical back: Normal range of motion and neck supple.   Skin:     General: Skin is warm and dry.      Findings: No erythema or rash.   Neurological:      General: No focal deficit present.      Mental Status: He is alert and oriented to person, place, and time.      Cranial Nerves: No cranial nerve deficit.      Motor: No weakness.      Gait: Gait normal.   Psychiatric:         Mood and Affect: Mood normal.         Behavior: Behavior normal.         Thought Content: Thought content normal.         Judgment: Judgment normal.         Assessment:     1. SVT (supraventricular tachycardia)  EKG 12-lead    Cardiac Monitor - 3-15 Day Adult (Cupid Only)    Echo    Hx SVT, having symptoms, off meds, will place on Zio to monitor arrhythmia.      2. Intermittent palpitations  Cardiac Monitor - 3-15 Day Adult (Cupid Only)    Echo    off medications, will place on outpatient tele to eval arrhythmia,  continue holding verapamil due to low blood presure.  order echo.      3. Bipolar 1 disorder      NOt fully controlled, pt is non-compliant with his meds and follow up.      4. Paranoid schizophrenia      Pt is changing physicians, symptoms not clearly controlled.      5. Tobacco abuse disorder      discussed smoking cessation, pt will consider, not able to pick stop date today.      6. Seizure disorder      appears controlled, last seizure over a month ago.      7. Noncompliance w/medication treatment due to intermit use of medication      REviewed indications for medications and need to take meds as prescribed.            Plan:   Follow up in three to four weeks to review results of above.

## 2024-08-23 PROBLEM — R00.2 INTERMITTENT PALPITATIONS: Status: ACTIVE | Noted: 2024-08-23

## 2024-08-23 PROBLEM — Z91.148 NONCOMPLIANCE W/MEDICATION TREATMENT DUE TO INTERMIT USE OF MEDICATION: Status: ACTIVE | Noted: 2024-08-23

## 2024-08-23 PROBLEM — I47.10 SVT (SUPRAVENTRICULAR TACHYCARDIA): Status: ACTIVE | Noted: 2024-08-23

## 2024-08-23 PROBLEM — F20.0 PARANOID SCHIZOPHRENIA: Status: ACTIVE | Noted: 2024-08-23

## 2024-08-23 PROBLEM — Z72.0 TOBACCO ABUSE DISORDER: Status: ACTIVE | Noted: 2024-08-23

## 2024-08-23 PROBLEM — G40.909 SEIZURE DISORDER: Status: ACTIVE | Noted: 2024-08-23

## 2024-08-23 PROBLEM — F31.9 BIPOLAR 1 DISORDER: Status: ACTIVE | Noted: 2024-08-23

## 2024-08-30 ENCOUNTER — TELEPHONE (OUTPATIENT)
Dept: CARDIOLOGY | Facility: CLINIC | Age: 24
End: 2024-08-30
Payer: MEDICAID

## 2024-09-23 ENCOUNTER — TELEPHONE (OUTPATIENT)
Dept: CARDIOLOGY | Facility: CLINIC | Age: 24
End: 2024-09-23
Payer: MEDICAID

## 2024-09-23 NOTE — TELEPHONE ENCOUNTER
Called the patient and his wife on today, to cancel his upcoming appointment being that he hadn't gotten his echo done for review.    The numbers on file were unavailable.

## 2024-09-25 ENCOUNTER — TELEPHONE (OUTPATIENT)
Dept: CARDIOLOGY | Facility: CLINIC | Age: 24
End: 2024-09-25
Payer: MEDICAID

## 2024-09-25 NOTE — TELEPHONE ENCOUNTER
Called the patient and his wife with the numbers on file to discuss cancelling his appointment on tomorrow being that his Echo was not completed, but the call couldn't be completed. Left patient a message on my chart as well.

## 2024-10-11 DIAGNOSIS — M79.645 CHRONIC PAIN OF LEFT THUMB: Primary | ICD-10-CM

## 2024-10-11 DIAGNOSIS — G89.29 CHRONIC PAIN OF LEFT THUMB: Primary | ICD-10-CM

## 2025-03-26 ENCOUNTER — HOSPITAL ENCOUNTER (EMERGENCY)
Facility: HOSPITAL | Age: 25
Discharge: HOME | End: 2025-03-26
Attending: EMERGENCY MEDICINE
Payer: MEDICAID

## 2025-03-26 VITALS
DIASTOLIC BLOOD PRESSURE: 60 MMHG | HEART RATE: 75 BPM | RESPIRATION RATE: 15 BRPM | OXYGEN SATURATION: 100 % | HEIGHT: 71 IN | BODY MASS INDEX: 21 KG/M2 | SYSTOLIC BLOOD PRESSURE: 97 MMHG | WEIGHT: 150 LBS | TEMPERATURE: 98 F

## 2025-03-26 DIAGNOSIS — R07.9 CHEST PAIN: ICD-10-CM

## 2025-03-26 LAB
ALBUMIN SERPL BCP-MCNC: 3.9 G/DL (ref 3.5–5)
ALBUMIN/GLOB SERPL: 1.6 {RATIO}
ALP SERPL-CCNC: 68 U/L (ref 40–150)
ALT SERPL W P-5'-P-CCNC: 11 U/L
ANION GAP SERPL CALCULATED.3IONS-SCNC: 13 MMOL/L (ref 7–16)
AST SERPL W P-5'-P-CCNC: 21 U/L (ref 11–45)
BASOPHILS # BLD AUTO: 0.02 K/UL (ref 0–0.2)
BASOPHILS NFR BLD AUTO: 0.3 % (ref 0–1)
BILIRUB SERPL-MCNC: 0.4 MG/DL
BUN SERPL-MCNC: 13 MG/DL (ref 9–21)
BUN/CREAT SERPL: 18 (ref 6–20)
CALCIUM SERPL-MCNC: 9 MG/DL (ref 8.4–10.2)
CHLORIDE SERPL-SCNC: 106 MMOL/L (ref 98–107)
CK SERPL-CCNC: 104 U/L (ref 30–200)
CO2 SERPL-SCNC: 24 MMOL/L (ref 22–29)
CREAT SERPL-MCNC: 0.73 MG/DL (ref 0.72–1.25)
DIFFERENTIAL METHOD BLD: ABNORMAL
EGFR (NO RACE VARIABLE) (RUSH/TITUS): 129 ML/MIN/1.73M2
EOSINOPHIL # BLD AUTO: 0.17 K/UL (ref 0–0.5)
EOSINOPHIL NFR BLD AUTO: 2.2 % (ref 1–4)
ERYTHROCYTE [DISTWIDTH] IN BLOOD BY AUTOMATED COUNT: 12.8 % (ref 11.5–14.5)
GLOBULIN SER-MCNC: 2.5 G/DL (ref 2–4)
GLUCOSE SERPL-MCNC: 99 MG/DL (ref 74–100)
HCT VFR BLD AUTO: 35.8 % (ref 40–54)
HGB BLD-MCNC: 11.5 G/DL (ref 13.5–18)
IMM GRANULOCYTES # BLD AUTO: 0.02 K/UL (ref 0–0.04)
IMM GRANULOCYTES NFR BLD: 0.3 % (ref 0–0.4)
INR BLD: 1.24
LYMPHOCYTES # BLD AUTO: 1.25 K/UL (ref 1–4.8)
LYMPHOCYTES NFR BLD AUTO: 16.4 % (ref 27–41)
MCH RBC QN AUTO: 26.6 PG (ref 27–31)
MCHC RBC AUTO-ENTMCNC: 32.1 G/DL (ref 32–36)
MCV RBC AUTO: 82.9 FL (ref 80–96)
MONOCYTES # BLD AUTO: 0.65 K/UL (ref 0–0.8)
MONOCYTES NFR BLD AUTO: 8.5 % (ref 2–6)
MPC BLD CALC-MCNC: 8.8 FL (ref 9.4–12.4)
MYOGLOBIN SERPL-MCNC: 26 NG/ML (ref 16–116)
NEUTROPHILS # BLD AUTO: 5.52 K/UL (ref 1.8–7.7)
NEUTROPHILS NFR BLD AUTO: 72.3 % (ref 53–65)
NRBC # BLD AUTO: 0 X10E3/UL
NRBC, AUTO (.00): 0 %
NT-PROBNP SERPL-MCNC: 8 PG/ML (ref 1–125)
OHS QRS DURATION: 100 MS
OHS QTC CALCULATION: 389 MS
PLATELET # BLD AUTO: 289 K/UL (ref 150–400)
POTASSIUM SERPL-SCNC: 3.8 MMOL/L (ref 3.5–5.1)
PROT SERPL-MCNC: 6.4 G/DL (ref 6.4–8.3)
PROTHROMBIN TIME: 15.5 SECONDS (ref 11.7–14.7)
RBC # BLD AUTO: 4.32 M/UL (ref 4.6–6.2)
SODIUM SERPL-SCNC: 139 MMOL/L (ref 136–145)
TROPONIN I SERPL HS-MCNC: <2.7 NG/L
WBC # BLD AUTO: 7.63 K/UL (ref 4.5–11)

## 2025-03-26 PROCEDURE — 82550 ASSAY OF CK (CPK): CPT | Performed by: EMERGENCY MEDICINE

## 2025-03-26 PROCEDURE — 80053 COMPREHEN METABOLIC PANEL: CPT | Performed by: EMERGENCY MEDICINE

## 2025-03-26 PROCEDURE — 99285 EMERGENCY DEPT VISIT HI MDM: CPT | Mod: 25

## 2025-03-26 PROCEDURE — 85610 PROTHROMBIN TIME: CPT | Performed by: EMERGENCY MEDICINE

## 2025-03-26 PROCEDURE — 83874 ASSAY OF MYOGLOBIN: CPT | Performed by: EMERGENCY MEDICINE

## 2025-03-26 PROCEDURE — 84484 ASSAY OF TROPONIN QUANT: CPT | Performed by: EMERGENCY MEDICINE

## 2025-03-26 PROCEDURE — 93005 ELECTROCARDIOGRAM TRACING: CPT

## 2025-03-26 PROCEDURE — 85025 COMPLETE CBC W/AUTO DIFF WBC: CPT | Performed by: EMERGENCY MEDICINE

## 2025-03-26 PROCEDURE — 93010 ELECTROCARDIOGRAM REPORT: CPT | Mod: ,,, | Performed by: INTERNAL MEDICINE

## 2025-03-26 PROCEDURE — 94761 N-INVAS EAR/PLS OXIMETRY MLT: CPT

## 2025-03-26 PROCEDURE — 83880 ASSAY OF NATRIURETIC PEPTIDE: CPT | Performed by: EMERGENCY MEDICINE

## 2025-03-26 NOTE — ED PROVIDER NOTES
"Encounter Date: 3/26/2025       History     Chief Complaint   Patient presents with    Chest Pain    Fatigue     25 y/o male complaining of some chest discomfort and feeling weak at work yesterday.  Symptoms were mild enough that he did not present yesterday.  He notes no particular remitting or exacerbating factors.          Review of patient's allergies indicates:   Allergen Reactions    Pseudoephedrine Itching and Other (See Comments)     hands  Numbness to hands  seizure      Aspirin Hives     Other reaction(s): Unknown    Dimetapp cold and flu     Red dye Other (See Comments)     seizure  seizure       Past Medical History:   Diagnosis Date    Asthma     Bipolar disorder     "adjustment disorder"    Coronary artery disease     Digestive disorder     Palpitation     Seizures     Syncope      Past Surgical History:   Procedure Laterality Date    DENTAL SURGERY      INSERTION OF IMPLANTABLE LOOP RECORDER      KNEE SURGERY Left     SHOULDER SURGERY Left     VAGUS NERVE STIMULATOR INSERTION       Family History   Problem Relation Name Age of Onset    Heart disease Maternal Grandmother      Stroke Maternal Grandmother      Heart attack Maternal Grandmother       Social History[1]  Review of Systems   All other systems reviewed and are negative.      Physical Exam     Initial Vitals [03/26/25 0725]   BP Pulse Resp Temp SpO2   104/67 85 20 97.7 °F (36.5 °C) 100 %      MAP       --         Physical Exam    Nursing note and vitals reviewed.  Constitutional: He appears well-developed and well-nourished.   HENT:   Head: Normocephalic and atraumatic.   Nose: Nose normal. Mouth/Throat: Oropharynx is clear and moist.   Eyes: Conjunctivae and EOM are normal. Pupils are equal, round, and reactive to light.   Neck: Neck supple.   Normal range of motion.  Cardiovascular:  Normal rate, regular rhythm and normal heart sounds.           Pulmonary/Chest: Breath sounds normal.   Abdominal: Abdomen is soft. Bowel sounds are normal. "   Musculoskeletal:         General: Normal range of motion.      Cervical back: Normal range of motion and neck supple.     Neurological: He is alert and oriented to person, place, and time. He has normal strength. GCS score is 15. GCS eye subscore is 4. GCS verbal subscore is 5. GCS motor subscore is 6.   Skin: Skin is warm and dry. Capillary refill takes less than 2 seconds.   Psychiatric: He has a normal mood and affect.         Medical Screening Exam   See Full Note    ED Course   Procedures  Labs Reviewed   PROTIME-INR - Abnormal       Result Value    PT 15.5 (*)     INR 1.24     CBC WITH DIFFERENTIAL - Abnormal    WBC 7.63      RBC 4.32 (*)     Hemoglobin 11.5 (*)     Hematocrit 35.8 (*)     MCV 82.9      MCH 26.6 (*)     MCHC 32.1      RDW 12.8      Platelet Count 289      MPV 8.8 (*)     Neutrophils % 72.3 (*)     Lymphocytes % 16.4 (*)     Monocytes % 8.5 (*)     Eosinophils % 2.2      Basophils % 0.3      Immature Granulocytes % 0.3      nRBC, Auto 0.0      Neutrophils, Abs 5.52      Lymphocytes, Absolute 1.25      Monocytes, Absolute 0.65      Eosinophils, Absolute 0.17      Basophils, Absolute 0.02      Immature Granulocytes, Absolute 0.02      nRBC, Absolute 0.00      Diff Type Auto     NT-PRO NATRIURETIC PEPTIDE - Normal    ProBNP 8     TROPONIN I - Normal    Troponin I High Sensitivity <2.7     CK - Normal         MYOGLOBIN, SERUM - Normal    Myoglobin 26     CBC W/ AUTO DIFFERENTIAL    Narrative:     The following orders were created for panel order CBC auto differential.  Procedure                               Abnormality         Status                     ---------                               -----------         ------                     CBC with Differential[[id number]]       Abnormal            Final result                 Please view results for these tests on the individual orders.   COMPREHENSIVE METABOLIC PANEL    Sodium 139      Potassium 3.8      Chloride 106      CO2 24       Anion Gap 13      Glucose 99      BUN 13      Creatinine 0.73      BUN/Creatinine Ratio 18      Calcium 9.0      Total Protein 6.4      Albumin 3.9      Globulin 2.5      A/G Ratio 1.6      Bilirubin, Total 0.4      Alk Phos 68      ALT 11      AST 21      eGFR 129          ECG Results              EKG 12-lead (Final result)        Collection Time Result Time QRS Duration OHS QTC Calculation    03/26/25 07:23:28 03/26/25 18:16:46 100 389                     Final result by Interface, Lab In [facility] (03/26/25 18:16:52)                   Narrative:    Test Reason : R07.9,    Vent. Rate :  73 BPM     Atrial Rate :    BPM     P-R Int : 102 ms          QRS Dur : 100 ms      QT Int : 366 ms       P-R-T Axes :  72  85  72 degrees    QTcB Int : 389 ms    Sinus rhythm  Short HI interval  Septal T wave abnormality is nonspecific  Borderline ECG    Confirmed by Tru Lancaster ([id number]) on 3/26/2025 6:16:45 PM    Referred By: AAAREFERRAL SELF           Confirmed By: Rehmat Tonny                                  Imaging Results              X-Ray Chest AP Portable (Final result)  Result time 03/26/25 08:13:43      Final result by Jefe Bar DO (03/26/25 08:13:43)                   Impression:      No acute abnormality.      Electronically signed by: Jefe Bar  Date:    03/26/2025  Time:    08:13               Narrative:    EXAMINATION:  XR CHEST AP PORTABLE    CLINICAL HISTORY:  Chest Pain;    TECHNIQUE:  Single frontal view of the chest was performed.    COMPARISON:  08/08/2020.    FINDINGS:  There is a cardiac pacer, unchanged.  There is a loop recorder device.  The lungs are well expanded and clear. No focal opacities are seen. The pleural spaces are clear. The cardiac silhouette is unremarkable. The visualized osseous structures are unremarkable.                                       Medications - No data to display  Medical Decision Making  23 y/o male complaining of some chest discomfort and feeling weak at work  yesterday.  Symptoms were mild enough that he did not present yesterday.  He notes no particular remitting or exacerbating factors.  23 y/o male complaining of some chest discomfort and feeling weak at work yesterday.  Symptoms were mild enough that he did not present yesterday.  He notes no particular remitting or exacerbating factors.    Chest discomfort and weakness at work yesterday.  He thinks he may have gotten too hot.  Still some chest discomfort that is mild.      Ddx:  cardiac vs metabolic vs neuro vs anxiety vs other    Outpatient follow up.      Amount and/or Complexity of Data Reviewed  Labs: ordered. Decision-making details documented in ED Course.  Radiology: ordered. Decision-making details documented in ED Course.  ECG/medicine tests: ordered and independent interpretation performed.     Details: Sinus mechanism.  Rate 73.  Septal nonspecific T wave abnormality.  Short VT.  Borderline ECG.                 ED Course as of 03/26/25 1924   Wed Mar 26, 2025   0924 PATIENT DECLINES TO GIVE A URINE SPECIMEN, AND HE IS ADAMANT THAT HE WANTS TO BE DISCHARGED TO GO TO WORK.   [LM]      ED Course User Index  [LM] Benjie Wick MD                           Clinical Impression:   Final diagnoses:  [R07.9] Chest pain        ED Disposition Condition    Discharge Stable          ED Prescriptions    None       Follow-up Information       Follow up With Specialties Details Why Contact Info    Stefani Vang, DO Family Medicine  As needed [address] [place] [address] [place] [id number]  [phone]                   Benjie Wick MD  03/26/25 0980       [1]   Social History  Tobacco Use    Smoking status: Every Day     Current packs/day: 1.00     Average packs/day: 1 pack/day for 7.0 years (7.0 ttl pk-yrs)     Types: Cigarettes    Smokeless tobacco: Never   Substance Use Topics    Alcohol use: Not Currently    Drug use: Never        Benjie Wick MD  03/26/25 1924

## 2025-03-26 NOTE — ED TRIAGE NOTES
"Javi Farnsworth, a 24 y.o. male presents to [facility] via pov with a chief complaint of chest pain, dizziness and palpitations. 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 Reports dark concentrated urine with less output than usual      Triage Note:  Chief Complaint   Patient presents with    Chest Pain    Fatigue     Stefani Vang DO  Review of patient's allergies indicates:   Allergen Reactions    Pseudoephedrine Itching and Other (See Comments)     hands  Numbness to hands  seizure      Aspirin Hives     Other reaction(s): Unknown    Dimetapp cold and flu     Red dye Other (See Comments)     seizure  seizure       Past Medical History:   Diagnosis Date    Asthma     Bipolar disorder     "adjustment disorder"    Coronary artery disease     Digestive disorder     Palpitation     Seizures     Syncope      Past Surgical History:   Procedure Laterality Date    DENTAL SURGERY      INSERTION OF IMPLANTABLE LOOP RECORDER      KNEE SURGERY Left     SHOULDER SURGERY Left     VAGUS NERVE STIMULATOR INSERTION       Social History[1]  Past Surgical History:   Procedure Laterality Date    DENTAL SURGERY      INSERTION OF IMPLANTABLE LOOP RECORDER      KNEE SURGERY Left     SHOULDER SURGERY Left     VAGUS NERVE STIMULATOR INSERTION       Vitals:    03/26/25 0725   BP: 104/67   Pulse: 85   Resp: 20   Temp: 97.7 °F (36.5 °C)     Current Medications[2]          [1]   Social History  Tobacco Use    Smoking status: Every Day     Current packs/day: 1.00     Average packs/day: 1 pack/day for 7.0 years (7.0 ttl pk-yrs)     Types: Cigarettes    Smokeless tobacco: Never   Substance Use Topics    Alcohol use: Not Currently    Drug use: Never   [2]   No current facility-administered medications for this encounter.     Current Outpatient Medications   Medication Sig Dispense Refill    pantoprazole (PROTONIX) 40 MG tablet Take 1 tablet (40 mg total) by mouth 2 (two) times daily. 180 " tablet 3

## 2025-03-27 ENCOUNTER — TELEPHONE (OUTPATIENT)
Dept: EMERGENCY MEDICINE | Facility: HOSPITAL | Age: 25
End: 2025-03-27
Payer: MEDICAID

## 2025-07-18 ENCOUNTER — HOSPITAL ENCOUNTER (EMERGENCY)
Facility: HOSPITAL | Age: 25
Discharge: HOME | End: 2025-07-18
Attending: EMERGENCY MEDICINE
Payer: MEDICAID

## 2025-07-18 VITALS
HEART RATE: 71 BPM | RESPIRATION RATE: 11 BRPM | DIASTOLIC BLOOD PRESSURE: 60 MMHG | HEIGHT: 72 IN | TEMPERATURE: 98 F | SYSTOLIC BLOOD PRESSURE: 104 MMHG | BODY MASS INDEX: 20.32 KG/M2 | OXYGEN SATURATION: 98 % | WEIGHT: 150 LBS

## 2025-07-18 DIAGNOSIS — S39.91XA BLUNT TRAUMA TO ABDOMEN, INITIAL ENCOUNTER: ICD-10-CM

## 2025-07-18 DIAGNOSIS — S29.8XXA BLUNT TRAUMA TO CHEST, INITIAL ENCOUNTER: Primary | ICD-10-CM

## 2025-07-18 DIAGNOSIS — S29.9XXA CHEST TRAUMA: ICD-10-CM

## 2025-07-18 LAB
ALBUMIN SERPL BCP-MCNC: 3.8 G/DL (ref 3.5–5)
ALBUMIN/GLOB SERPL: 1.5 {RATIO}
ALP SERPL-CCNC: 57 U/L (ref 40–150)
ALT SERPL W P-5'-P-CCNC: 11 U/L
AMPHET UR QL SCN: NEGATIVE
ANION GAP SERPL CALCULATED.3IONS-SCNC: 11 MMOL/L (ref 7–16)
APTT PPP: 20.5 SECONDS (ref 25.2–37.3)
AST SERPL W P-5'-P-CCNC: 34 U/L (ref 11–45)
BARBITURATES UR QL SCN: NEGATIVE
BASOPHILS # BLD AUTO: 0.02 K/UL (ref 0–0.2)
BASOPHILS NFR BLD AUTO: 0.3 % (ref 0–1)
BENZODIAZ METAB UR QL SCN: NEGATIVE
BILIRUB SERPL-MCNC: 0.3 MG/DL
BILIRUB UR QL STRIP: NEGATIVE
BUN SERPL-MCNC: 15 MG/DL (ref 9–21)
BUN/CREAT SERPL: 18 (ref 6–20)
CALCIUM SERPL-MCNC: 8.1 MG/DL (ref 8.4–10.2)
CANNABINOIDS UR QL SCN: NEGATIVE
CHLORIDE SERPL-SCNC: 110 MMOL/L (ref 98–107)
CLARITY UR: CLEAR
CO2 SERPL-SCNC: 24 MMOL/L (ref 22–29)
COCAINE UR QL SCN: NEGATIVE
COLOR UR: COLORLESS
CREAT SERPL-MCNC: 0.83 MG/DL (ref 0.72–1.25)
DIFFERENTIAL METHOD BLD: ABNORMAL
EGFR (NO RACE VARIABLE) (RUSH/TITUS): 125 ML/MIN/1.73M2
EOSINOPHIL # BLD AUTO: 0.19 K/UL (ref 0–0.5)
EOSINOPHIL NFR BLD AUTO: 2.8 % (ref 1–4)
ERYTHROCYTE [DISTWIDTH] IN BLOOD BY AUTOMATED COUNT: 12.9 % (ref 11.5–14.5)
ETHANOL SERPL-MCNC: <10 MG/DL
GLOBULIN SER-MCNC: 2.5 G/DL (ref 2–4)
GLUCOSE SERPL-MCNC: 87 MG/DL (ref 74–100)
GLUCOSE UR STRIP-MCNC: NORMAL MG/DL
HCT VFR BLD AUTO: 32.3 % (ref 40–54)
HGB BLD-MCNC: 10.4 G/DL (ref 13.5–18)
IMM GRANULOCYTES # BLD AUTO: 0.02 K/UL (ref 0–0.04)
IMM GRANULOCYTES NFR BLD: 0.3 % (ref 0–0.4)
INR BLD: 1.11
KETONES UR STRIP-SCNC: NEGATIVE MG/DL
LEUKOCYTE ESTERASE UR QL STRIP: NEGATIVE
LYMPHOCYTES # BLD AUTO: 1.65 K/UL (ref 1–4.8)
LYMPHOCYTES NFR BLD AUTO: 24.6 % (ref 27–41)
MCH RBC QN AUTO: 26 PG (ref 27–31)
MCHC RBC AUTO-ENTMCNC: 32.2 G/DL (ref 32–36)
MCV RBC AUTO: 80.8 FL (ref 80–96)
MONOCYTES # BLD AUTO: 0.43 K/UL (ref 0–0.8)
MONOCYTES NFR BLD AUTO: 6.4 % (ref 2–6)
MPC BLD CALC-MCNC: 9.3 FL (ref 9.4–12.4)
NEUTROPHILS # BLD AUTO: 4.4 K/UL (ref 1.8–7.7)
NEUTROPHILS NFR BLD AUTO: 65.6 % (ref 53–65)
NITRITE UR QL STRIP: NEGATIVE
NRBC # BLD AUTO: 0 X10E3/UL
NRBC, AUTO (.00): 0 %
OPIATES UR QL SCN: NEGATIVE
PCP UR QL SCN: NEGATIVE
PH UR STRIP: 7 PH UNITS
PLATELET # BLD AUTO: 263 K/UL (ref 150–400)
POTASSIUM SERPL-SCNC: 3.9 MMOL/L (ref 3.5–5.1)
PROT SERPL-MCNC: 6.3 G/DL (ref 6.4–8.3)
PROT UR QL STRIP: NEGATIVE
PROTHROMBIN TIME: 14.4 SECONDS (ref 11.7–14.7)
RBC # BLD AUTO: 4 M/UL (ref 4.6–6.2)
RBC # UR STRIP: NEGATIVE /UL
SODIUM SERPL-SCNC: 141 MMOL/L (ref 136–145)
SP GR UR STRIP: 1.02
UROBILINOGEN UR STRIP-ACNC: NORMAL MG/DL
WBC # BLD AUTO: 6.71 K/UL (ref 4.5–11)

## 2025-07-18 PROCEDURE — 80307 DRUG TEST PRSMV CHEM ANLYZR: CPT | Performed by: EMERGENCY MEDICINE

## 2025-07-18 PROCEDURE — 25500020 PHARM REV CODE 255: Performed by: EMERGENCY MEDICINE

## 2025-07-18 PROCEDURE — 96376 TX/PRO/DX INJ SAME DRUG ADON: CPT

## 2025-07-18 PROCEDURE — G0390 TRAUMA RESPONS W/HOSP CRITI: HCPCS

## 2025-07-18 PROCEDURE — 96374 THER/PROPH/DIAG INJ IV PUSH: CPT

## 2025-07-18 PROCEDURE — 99285 EMERGENCY DEPT VISIT HI MDM: CPT | Mod: 25

## 2025-07-18 PROCEDURE — 85025 COMPLETE CBC W/AUTO DIFF WBC: CPT | Performed by: EMERGENCY MEDICINE

## 2025-07-18 PROCEDURE — 82077 ASSAY SPEC XCP UR&BREATH IA: CPT | Performed by: EMERGENCY MEDICINE

## 2025-07-18 PROCEDURE — 81003 URINALYSIS AUTO W/O SCOPE: CPT | Performed by: EMERGENCY MEDICINE

## 2025-07-18 PROCEDURE — 93005 ELECTROCARDIOGRAM TRACING: CPT

## 2025-07-18 PROCEDURE — 85730 THROMBOPLASTIN TIME PARTIAL: CPT | Performed by: EMERGENCY MEDICINE

## 2025-07-18 PROCEDURE — 80053 COMPREHEN METABOLIC PANEL: CPT | Performed by: EMERGENCY MEDICINE

## 2025-07-18 PROCEDURE — 85610 PROTHROMBIN TIME: CPT | Performed by: EMERGENCY MEDICINE

## 2025-07-18 PROCEDURE — 36415 COLL VENOUS BLD VENIPUNCTURE: CPT | Performed by: EMERGENCY MEDICINE

## 2025-07-18 PROCEDURE — 63600175 PHARM REV CODE 636 W HCPCS: Mod: UD | Performed by: EMERGENCY MEDICINE

## 2025-07-18 RX ORDER — PREGABALIN 75 MG/1
75 CAPSULE ORAL 2 TIMES DAILY
COMMUNITY

## 2025-07-18 RX ORDER — TIZANIDINE 4 MG/1
4 TABLET ORAL EVERY 6 HOURS PRN
Qty: 30 TABLET | Refills: 0 | Status: SHIPPED | OUTPATIENT
Start: 2025-07-18 | End: 2025-07-28

## 2025-07-18 RX ORDER — FENTANYL CITRATE 50 UG/ML
50 INJECTION, SOLUTION INTRAMUSCULAR; INTRAVENOUS
Refills: 0 | Status: COMPLETED | OUTPATIENT
Start: 2025-07-18 | End: 2025-07-18

## 2025-07-18 RX ORDER — IOPAMIDOL 755 MG/ML
100 INJECTION, SOLUTION INTRAVASCULAR
Status: COMPLETED | OUTPATIENT
Start: 2025-07-18 | End: 2025-07-18

## 2025-07-18 RX ORDER — DICLOFENAC SODIUM 50 MG/1
50 TABLET, DELAYED RELEASE ORAL 3 TIMES DAILY
Qty: 30 TABLET | Refills: 0 | Status: SHIPPED | OUTPATIENT
Start: 2025-07-18

## 2025-07-18 RX ADMIN — FENTANYL CITRATE 50 MCG: 50 INJECTION INTRAMUSCULAR; INTRAVENOUS at 09:07

## 2025-07-18 RX ADMIN — FENTANYL CITRATE 50 MCG: 50 INJECTION INTRAMUSCULAR; INTRAVENOUS at 10:07

## 2025-07-18 RX ADMIN — IOPAMIDOL 100 ML: 755 INJECTION, SOLUTION INTRAVENOUS at 09:07

## 2025-07-18 NOTE — Clinical Note
Mireille Faust accompanied their child to the emergency department on 7/18/2025. They may return to work on 07/20/2025.      If you have any questions or concerns, please don't hesitate to call.      TIA Arreguin RN

## 2025-07-19 NOTE — ED PROVIDER NOTES
"Encounter Date: 7/18/2025    SCRIBE #1 NOTE: I, Wellington Killian, am scribing for, and in the presence of,  Demian Garcia MD. I have scribed the entire note.         Chief Complaint   Patient presents with    Trauma     Patient was working on a gas line under a [employer] Fusion, when the bethel fell, and the car landed on top of him     Javi Farnsworth is a 24 y.o. male presenting to the ED via EMS due to a car falling on his chest. PT was working on a gas line under a car when the bethel fell and the car landed on top of him. PT reports left rib pain, back pain and neck pain. He denies abdominal pain, arm pain, leg pain and hitting his head. PT denies alcohol and drug use. PT has Hx of CAD, Palpitation and Seizures. PT reports bone spur.     The history is provided by the patient. No  was used.     Review of patient's allergies indicates:   Allergen Reactions    Pseudoephedrine Itching and Other (See Comments)     hands  Numbness to hands  seizure      Aspirin Hives     Other reaction(s): Unknown    Dimetapp cold and flu     Red dye Other (See Comments)     seizure  seizure       Past Medical History:   Diagnosis Date    Asthma     Bipolar disorder     "adjustment disorder"    Coronary artery disease     Digestive disorder     Palpitation     Seizures     Syncope      Past Surgical History:   Procedure Laterality Date    DENTAL SURGERY      INSERTION OF IMPLANTABLE LOOP RECORDER      KNEE SURGERY Left     SHOULDER SURGERY Left     VAGUS NERVE STIMULATOR INSERTION       Family History   Problem Relation Name Age of Onset    Heart disease Maternal Grandmother      Stroke Maternal Grandmother      Heart attack Maternal Grandmother       Social History[1]  Review of Systems   Gastrointestinal:  Negative for abdominal pain.   Musculoskeletal:  Positive for back pain and neck pain.        PT reports left sided rib pain. PT denies arm pain. PT denies leg pain.    All other systems reviewed and are " negative.      Physical Exam     Initial Vitals   BP Pulse Resp Temp SpO2   07/18/25 2119 07/18/25 2117 07/18/25 2117 07/18/25 2119 07/18/25 2118   113/74 81 16 97.3 °F (36.3 °C) 100 %      MAP       --                Physical Exam    Nursing note and vitals reviewed.  Constitutional: Cervical collar in place.   HENT:   Head: Normocephalic and atraumatic. Mouth/Throat: Oropharynx is clear and moist.   PT did not hit head.   Eyes: Pupils are equal, round, and reactive to light.   Neck: Neck supple.   Normal range of motion.  Cardiovascular:  Normal rate and regular rhythm.           Pulmonary/Chest: Effort normal and breath sounds normal. He exhibits tenderness (There is left sided tenderness.).   Abdominal: Abdomen is soft. He exhibits no distension. There is abdominal tenderness in the left upper quadrant.   Musculoskeletal:         General: Normal range of motion.      Cervical back: Normal range of motion and neck supple.      Comments: There is mild back tenderness.     Neurological: He is alert.   Skin: Skin is warm. Capillary refill takes less than 2 seconds.   Psychiatric: He has a normal mood and affect.       ED Course   Procedures  Labs Reviewed   COMPREHENSIVE METABOLIC PANEL - Abnormal       Result Value    Sodium 141      Potassium 3.9      Chloride 110 (*)     CO2 24      Anion Gap 11      Glucose 87      BUN 15      Creatinine 0.83      BUN/Creatinine Ratio 18      Calcium 8.1 (*)     Total Protein 6.3 (*)     Albumin 3.8      Globulin 2.5      A/G Ratio 1.5      Bilirubin, Total 0.3      Alk Phos 57      ALT 11      AST 34      eGFR 125     CBC WITH DIFFERENTIAL - Abnormal    WBC 6.71      RBC 4.00 (*)     Hemoglobin 10.4 (*)     Hematocrit 32.3 (*)     MCV 80.8      MCH 26.0 (*)     MCHC 32.2      RDW 12.9      Platelet Count 263      MPV 9.3 (*)     Neutrophils % 65.6 (*)     Lymphocytes % 24.6 (*)     Monocytes % 6.4 (*)     Eosinophils % 2.8      Basophils % 0.3      Immature Granulocytes % 0.3       nRBC, Auto 0.0      Neutrophils, Abs 4.40      Lymphocytes, Absolute 1.65      Monocytes, Absolute 0.43      Eosinophils, Absolute 0.19      Basophils, Absolute 0.02      Immature Granulocytes, Absolute 0.02      nRBC, Absolute 0.00      Diff Type Auto     APTT - Abnormal    PTT 20.5 (*)    PROTIME-INR - Normal    PT 14.4      INR 1.11     DRUG SCREEN, URINE (BEAKER) - Normal    Barbiturates, Urine Negative      Benzodiazepine, Urine Negative      Opiates, Urine Negative      Phencyclidine, Urine Negative      Amphetamine, Urine Negative      Cannabinoid, Urine Negative      Cocaine, Urine Negative      Narrative:     This screen includes the following classes of drugs at the listed cut-off:    Benzodiazepines 200 ng/ml  Cocaine metabolite 300 ng/ml  Opiates 2000 ng/ml  Barbiturates 200 ng/ml  Amphetamines 500 ng/ml  Marijuana metabs (THC) 50 ng/ml  Phencyclidine (PCP) 25 ng/ml    This is a screening test. If results do not correlate with clinical presentation, then a confirmatory send out test is advised.   ALCOHOL,MEDICAL (ETHANOL) - Normal    Ethanol <10     CBC W/ AUTO DIFFERENTIAL    Narrative:     The following orders were created for panel order CBC auto differential.  Procedure                               Abnormality         Status                     ---------                               -----------         ------                     CBC with Differential[[id number]]       Abnormal            Final result                 Please view results for these tests on the individual orders.   URINALYSIS    Color, UA Colorless      Clarity, UA Clear      pH, UA 7.0      Leukocytes, UA Negative      Nitrites, UA Negative      Protein, UA Negative      Glucose, UA Normal      Ketones, UA Negative      Urobilinogen, UA Normal      Bilirubin, UA Negative      Blood, UA Negative      Specific Gravity, UA 1.017            Imaging Results              CT Chest Abdomen Pelvis With IV Contrast (XPD) NO Oral Contrast  (Final result)  Result time 07/18/25 22:17:24      Final result by Taurus Matt MD (07/18/25 22:17:24)                   Impression:      1. Few distended and also mildly dilated small bowel loops with air-fluid level seen in the left mid abdomen on the arterial phase, noting slightly less degree of distension on the 5 minute delayed phase.  No wall thickening or adjacent inflammatory change and no intra-abdominal free air or free fluid.  Findings may relate to peristalsis or nonspecific ileus; however, developing small bowel obstruction or sequela of occult bowel injury in the setting of recent trauma not excluded in the proper clinical setting.  Further evaluation/follow-up as warranted.  2. Otherwise no acute intrathoracic process, pneumothorax, evidence of solid organ injury/hematoma or acute displaced fracture-dislocation.  3. Hepatic dome 2.2 cm hypoattenuating lesion suggestive of a hemangioma.  Additional lateral left hepatic lobe subcentimeter hypoattenuating parenchymal lesion which could represent a small hemangioma but overall too small to adequately characterize.  Correlation with any outside facility imaging if/when available would be helpful to establish stability.  Follow-up with CT or MRI of the abdomen with hepatic mass protocol in 3-6 months can be obtained, as warranted.  4. Moderately distended urinary bladder without wall thickening or adjacent inflammatory change.  Correlate for urinary retention.  5. Other incidental/nonemergent findings in the body of the report.      Electronically signed by: Taurus Matt MD  Date:    07/18/2025  Time:    22:17               Narrative:    EXAMINATION:  CT CHEST ABDOMEN PELVIS WITH IV CONTRAST (XPD)    CLINICAL HISTORY:  Polytrauma, blunt;    TECHNIQUE:  Axial images of the chest, abdomen, and pelvis were acquired  after the use of 100 cc [id number] IV contrast. 5 minute delayed axial images were obtained through the abdomen and pelvis.  Coronal and sagittal  reconstructions and axial MIPS were also obtained    COMPARISON:  Chest radiograph and cervical spine CT same date thoracic spine series 12/27/2021, thoracic and lumbar spine series 04/26/2021; report only from outside facility abdominal radiograph 03/21/2018    FINDINGS:  Beam hardening with streak artifact from overlying monitoring leads and adjacent upper extremities somewhat limits evaluation.    Structures at the base of the neck are within normal limits.    Thoracic soft tissues: No significant abnormality noting left upper chest wall subcutaneous implanted device with single lead extending cephalad to the cervical region on the left refer to cervical spine CT same date for additional details.  Medial lower left chest wall loop recorder device in place.    Aorta: Normal in course and caliber, without significant atherosclerotic plaque. Left-sided arch with bovine configuration.  No evidence of aortic aneurysm or dissection.    Heart: Normal in size. No pericardial effusion.    Zakia/Mediastinum: No significant lymphadenopathy.  Esophagus is normal in course and caliber.  Triangular shaped homogeneous soft tissue density within the anterior mediastinum without significant mass effect likely residual thymus.    Lungs: Trachea is midline.  Proximal airways are patent.  The lungs are symmetrically well expanded.  Minimal biapical pleuroparenchymal scarring.  No consolidation, pleural effusion or pneumothorax.  No concerning pulmonary nodules.  No pleural based masses.    Liver: Normal in size and attenuation.  Within the right lateral peripheral aspect of the hepatic dome, there is a 2.2 cm hypoattenuating lesion with average 40 Hounsfield units internally which shows some peripheral interrupted nodular fill-in on the delayed phase suggestive of a hemangioma in this age group.  Within the lateral left hepatic lobe there is a subcentimeter hypoattenuating parenchymal lesion on the arterial phase which is no longer  visualized on the delayed phase could reflect a small hemangioma but overall too small to adequately characterize.    Gallbladder: No calcified gallstones.    Bile Ducts: No evidence of dilated ducts.    Pancreas: No mass or peripancreatic fat stranding.    Spleen: Unremarkable.    Adrenals: Unremarkable.    Kidneys/ Ureters: Normal in size and location. Normal concentration and excretion of contrast. No hydronephrosis or nephrolithiasis. No ureteral dilatation.    Bladder: Moderately distended without evidence of wall thickening or adjacent inflammatory change.    Reproductive organs: Unremarkable.    GI Tract/Mesentery: There are a few distended and also mildly dilated small bowel loops with air-fluid level seen in the left mid abdomen on the arterial phase without wall thickening, adjacent inflammatory change or abrupt transition point, noting slightly less degree of distension on the 5 minute delayed images.  Remaining small and large loops of bowel are within normal limits.  Appendix not localized; however, no pericecal inflammatory change.  No bowel pneumatosis or portal venous gas.    Peritoneal Space: No ascites. No free air.    Retroperitoneum:  No significant adenopathy.    Abdominal wall:  Within normal limits.    Vasculature: No significant atherosclerosis or aneurysm.    Bones: Mild levocurvature of the thoracolumbar spine.  No acute fracture.  No dislocation or destructive osseous process.  Chronic multilevel minimal to mild anterior wedge deformity of several mid to lower thoracic and also L1 vertebral bodies not significantly changed from thoracic and lumbar spine series 12/27/2021.  No evidence for acute vertebral compression fracture.  Few subcentimeter sclerotic foci at the medial posterior aspect of the right iliac body and also posterolateral left 6th rib likely bone islands.  Minimal degenerative change.                                       CT Cervical Spine Without Contrast (Final result)   Result time 07/18/25 21:59:21      Final result by Taurus Matt MD (07/18/25 21:59:21)                   Impression:      No CT evidence of cervical spine acute osseous traumatic injury.      Electronically signed by: Taurus Matt MD  Date:    07/18/2025  Time:    21:59               Narrative:    EXAMINATION:  CT CERVICAL SPINE WITHOUT CONTRAST    CLINICAL HISTORY:  Neck trauma, dangerous injury mechanism (Age 16-64y);    TECHNIQUE:  Low dose axial images, sagittal and coronal reformations were performed though the cervical spine.  Contrast was not administered.    COMPARISON:  Chest radiograph same date, cervical spine series 12/27/2021    FINDINGS:  Sagittal reformatted images demonstrate adequate alignment of the cervical spine.  There is no evidence of fracture or dislocation.  No destructive osseous process.  Bones are well mineralized.  Vertebral body and intervertebral disc space heights appear relatively maintained.  Dens and lateral masses are well aligned and intact.    No significant degenerative change, spinal canal stenosis, or neural foraminal narrowing at any level.    The soft tissue structures visualized in the neck are within normal limits noting partially imaged lead extending from the left upper chest to the lower medial anterior left neck soft tissues about the carotid space and multiple scattered nonenlarged subcentimeter cervical lymph nodes bilaterally.    The airway is patent and the lung apices are clear without pneumothorax.  The visualized portions of the brain demonstrate no significant abnormality.                                       X-Ray Chest AP Portable (Final result)  Result time 07/18/25 21:53:04      Final result by Taurus Matt MD (07/18/25 21:53:04)                   Impression:      No radiographic acute intrathoracic process seen on this limited single view.      Electronically signed by: Taurus Matt MD  Date:    07/18/2025  Time:    21:53               Narrative:     EXAMINATION:  XR CHEST AP PORTABLE    CLINICAL HISTORY:  Crush injury;    TECHNIQUE:  Single frontal view of the chest was performed.    COMPARISON:  Chest radiograph 03/26/2025    FINDINGS:  Monitoring leads overlie the chest.  Skin folds overlie the upper chest.  The superior most aspects of the lung apices are excluded limiting evaluation.    Left upper chest device with lead extending towards the neck in place.  Lower left chest loop recorder device in place.    The lungs are well expanded and grossly clear allowing for noninclusion of the superior most lung apices.  No sizable pleural effusion or definite pneumothorax.    The cardiac silhouette is normal in size. The hilar and mediastinal contours are unremarkable.    No acute osseous process seen.  PA and lateral views can be obtained.                                    X-Rays:   Independently Interpreted Readings:   Chest X-Ray: FINDINGS:  Monitoring leads overlie the chest.  Skin folds overlie the upper chest.  The superior most aspects of the lung apices are excluded limiting evaluation.     Left upper chest device with lead extending towards the neck in place.  Lower left chest loop recorder device in place.     The lungs are well expanded and grossly clear allowing for noninclusion of the superior most lung apices.  No sizable pleural effusion or definite pneumothorax.     The cardiac silhouette is normal in size. The hilar and mediastinal contours are unremarkable.     No acute osseous process seen.  PA and lateral views can be obtained.     Impression:     No radiographic acute intrathoracic process seen on this limited single view.     Other Readings:  CT Chest Abdomen Pelvis With IV Contrast (XPD) NO Oral Contrast  FINDINGS:  Beam hardening with streak artifact from overlying monitoring leads and adjacent upper extremities somewhat limits evaluation.     Structures at the base of the neck are within normal limits.     Thoracic soft tissues: No  significant abnormality noting left upper chest wall subcutaneous implanted device with single lead extending cephalad to the cervical region on the left refer to cervical spine CT same date for additional details.  Medial lower left chest wall loop recorder device in place.     Aorta: Normal in course and caliber, without significant atherosclerotic plaque. Left-sided arch with bovine configuration.  No evidence of aortic aneurysm or dissection.     Heart: Normal in size. No pericardial effusion.     Zakia/Mediastinum: No significant lymphadenopathy.  Esophagus is normal in course and caliber.  Triangular shaped homogeneous soft tissue density within the anterior mediastinum without significant mass effect likely residual thymus.     Lungs: Trachea is midline.  Proximal airways are patent.  The lungs are symmetrically well expanded.  Minimal biapical pleuroparenchymal scarring.  No consolidation, pleural effusion or pneumothorax.  No concerning pulmonary nodules.  No pleural based masses.     Liver: Normal in size and attenuation.  Within the right lateral peripheral aspect of the hepatic dome, there is a 2.2 cm hypoattenuating lesion with average 40 Hounsfield units internally which shows some peripheral interrupted nodular fill-in on the delayed phase suggestive of a hemangioma in this age group.  Within the lateral left hepatic lobe there is a subcentimeter hypoattenuating parenchymal lesion on the arterial phase which is no longer visualized on the delayed phase could reflect a small hemangioma but overall too small to adequately characterize.     Gallbladder: No calcified gallstones.     Bile Ducts: No evidence of dilated ducts.     Pancreas: No mass or peripancreatic fat stranding.     Spleen: Unremarkable.     Adrenals: Unremarkable.     Kidneys/ Ureters: Normal in size and location. Normal concentration and excretion of contrast. No hydronephrosis or nephrolithiasis. No ureteral dilatation.     Bladder:  Moderately distended without evidence of wall thickening or adjacent inflammatory change.     Reproductive organs: Unremarkable.     GI Tract/Mesentery: There are a few distended and also mildly dilated small bowel loops with air-fluid level seen in the left mid abdomen on the arterial phase without wall thickening, adjacent inflammatory change or abrupt transition point, noting slightly less degree of distension on the 5 minute delayed images.  Remaining small and large loops of bowel are within normal limits.  Appendix not localized; however, no pericecal inflammatory change.  No bowel pneumatosis or portal venous gas.     Peritoneal Space: No ascites. No free air.     Retroperitoneum:  No significant adenopathy.     Abdominal wall:  Within normal limits.     Vasculature: No significant atherosclerosis or aneurysm.     Bones: Mild levocurvature of the thoracolumbar spine.  No acute fracture.  No dislocation or destructive osseous process.  Chronic multilevel minimal to mild anterior wedge deformity of several mid to lower thoracic and also L1 vertebral bodies not significantly changed from thoracic and lumbar spine series 12/27/2021.  No evidence for acute vertebral compression fracture.  Few subcentimeter sclerotic foci at the medial posterior aspect of the right iliac body and also posterolateral left 6th rib likely bone islands.  Minimal degenerative change.     Impression:     1. Few distended and also mildly dilated small bowel loops with air-fluid level seen in the left mid abdomen on the arterial phase, noting slightly less degree of distension on the 5 minute delayed phase.  No wall thickening or adjacent inflammatory change and no intra-abdominal free air or free fluid.  Findings may relate to peristalsis or nonspecific ileus; however, developing small bowel obstruction or sequela of occult bowel injury in the setting of recent trauma not excluded in the proper clinical setting.  Further  evaluation/follow-up as warranted.  2. Otherwise no acute intrathoracic process, pneumothorax, evidence of solid organ injury/hematoma or acute displaced fracture-dislocation.  3. Hepatic dome 2.2 cm hypoattenuating lesion suggestive of a hemangioma.  Additional lateral left hepatic lobe subcentimeter hypoattenuating parenchymal lesion which could represent a small hemangioma but overall too small to adequately characterize.  Correlation with any outside facility imaging if/when available would be helpful to establish stability.  Follow-up with CT or MRI of the abdomen with hepatic mass protocol in 3-6 months can be obtained, as warranted.  4. Moderately distended urinary bladder without wall thickening or adjacent inflammatory change.  Correlate for urinary retention.  5. Other incidental/nonemergent findings in the body of the report.    CT Cervical Spine Without Contrast  FINDINGS:  Sagittal reformatted images demonstrate adequate alignment of the cervical spine.  There is no evidence of fracture or dislocation.  No destructive osseous process.  Bones are well mineralized.  Vertebral body and intervertebral disc space heights appear relatively maintained.  Dens and lateral masses are well aligned and intact.     No significant degenerative change, spinal canal stenosis, or neural foraminal narrowing at any level.     The soft tissue structures visualized in the neck are within normal limits noting partially imaged lead extending from the left upper chest to the lower medial anterior left neck soft tissues about the carotid space and multiple scattered nonenlarged subcentimeter cervical lymph nodes bilaterally.     The airway is patent and the lung apices are clear without pneumothorax.  The visualized portions of the brain demonstrate no significant abnormality.     Impression:     No CT evidence of cervical spine acute osseous traumatic injury.        Medications   fentaNYL injection 50 mcg (50 mcg Intravenous  Given 7/18/25 2152)   iopamidoL (ISOVUE-370) injection 100 mL (100 mLs Intravenous Given 7/18/25 2156)   fentaNYL injection 50 mcg (50 mcg Intravenous Given 7/18/25 2240)     Medical Decision Making  Amount and/or Complexity of Data Reviewed  Labs: ordered.  Radiology: ordered.    Risk  Prescription drug management.              Attending Attestation:           Physician Attestation for Scribe:  Physician Attestation Statement for Scribe #1: I, Demian Garcia MD, reviewed documentation, as scribed by Wellington Killian in my presence, and it is both accurate and complete.             ED Course as of 07/19/25 0044   Fri Jul 18, 2025 2119 Medical decision-making:  Differential diagnosis includes chest injury, abdominal injury, C-spine injury, crush injury.  All testing ordered and interpreted by me. [BB]   2122 Chest x-ray by my interpretation shows no hemothorax, no pneumothorax. [BB]   2123 EKG by my interpretation shows sinus rhythm, rate of 76, no acute ST segment changes. [BB]   2230 CT chest abdomen and pelvis shows no definite traumatic findings. [BB]   2230 CT cervical spine is normal. [BB]   2231 Urinalysis is normal.  CBC is normal except mild anemia. [BB]      ED Course User Index  [BB] Demian Garcia MD                               Clinical Impression:  Final diagnoses:  [S29.8XXA] Blunt trauma to chest, initial encounter (Primary)  [S39.91XA] Blunt trauma to abdomen, initial encounter          ED Disposition Condition    Discharge Stable          ED Prescriptions       Medication Sig Dispense Start Date End Date Auth. Provider    diclofenac (VOLTAREN) 50 MG EC tablet Take 1 tablet (50 mg total) by mouth 3 (three) times daily. P.r.n. pain 30 tablet 7/18/2025 -- Demian Garcia MD    tiZANidine (ZANAFLEX) 4 MG tablet Take 1 tablet (4 mg total) by mouth every 6 (six) hours as needed. 30 tablet 7/18/2025 7/28/2025 Demian Garcia MD          Follow-up Information    None                [1]   Social  History  Tobacco Use    Smoking status: Every Day     Current packs/day: 1.00     Average packs/day: 1 pack/day for 7.0 years (7.0 ttl pk-yrs)     Types: Cigarettes    Smokeless tobacco: Never   Substance Use Topics    Alcohol use: Not Currently    Drug use: Never        Demian Garcia MD  07/19/25 0044